# Patient Record
Sex: FEMALE | Race: BLACK OR AFRICAN AMERICAN | NOT HISPANIC OR LATINO | Employment: UNEMPLOYED | URBAN - METROPOLITAN AREA
[De-identification: names, ages, dates, MRNs, and addresses within clinical notes are randomized per-mention and may not be internally consistent; named-entity substitution may affect disease eponyms.]

---

## 2017-01-23 ENCOUNTER — PATIENT OUTREACH (OUTPATIENT)
Dept: ADMINISTRATIVE | Facility: HOSPITAL | Age: 62
End: 2017-01-23
Payer: COMMERCIAL

## 2017-01-23 NOTE — PATIENT INSTRUCTIONS
Mailed patient a letter regarding overdue health maintenance and need for an office visit with the pcp

## 2018-03-11 ENCOUNTER — HOSPITAL ENCOUNTER (EMERGENCY)
Facility: HOSPITAL | Age: 63
Discharge: HOME | End: 2018-03-11
Attending: EMERGENCY MEDICINE
Payer: COMMERCIAL

## 2018-03-11 VITALS
HEIGHT: 65 IN | OXYGEN SATURATION: 100 % | WEIGHT: 185 LBS | TEMPERATURE: 99 F | HEART RATE: 103 BPM | BODY MASS INDEX: 30.82 KG/M2 | SYSTOLIC BLOOD PRESSURE: 225 MMHG | RESPIRATION RATE: 18 BRPM | DIASTOLIC BLOOD PRESSURE: 88 MMHG

## 2018-03-11 DIAGNOSIS — Z91.89 AT RISK FOR HYPERGLYCEMIA: Primary | ICD-10-CM

## 2018-03-11 DIAGNOSIS — R53.1 GENERAL WEAKNESS: ICD-10-CM

## 2018-03-11 LAB — POCT GLUCOSE: 134 MG/DL (ref 70–110)

## 2018-03-11 PROCEDURE — 82962 GLUCOSE BLOOD TEST: CPT

## 2018-03-11 PROCEDURE — 99282 EMERGENCY DEPT VISIT SF MDM: CPT | Mod: ,,, | Performed by: EMERGENCY MEDICINE

## 2018-03-11 PROCEDURE — 99283 EMERGENCY DEPT VISIT LOW MDM: CPT | Mod: 25

## 2018-03-11 RX ORDER — INSULIN GLARGINE 100 [IU]/ML
25 INJECTION, SOLUTION SUBCUTANEOUS DAILY
COMMUNITY
End: 2020-03-17

## 2018-03-11 RX ORDER — NIFEDIPINE 10 MG/1
30 CAPSULE ORAL DAILY
COMMUNITY
End: 2020-03-17 | Stop reason: ALTCHOICE

## 2018-03-11 RX ORDER — CHLORTHALIDONE 25 MG/1
25 TABLET ORAL DAILY
COMMUNITY

## 2018-03-11 NOTE — ED PROVIDER NOTES
"Encounter Date: 3/11/2018    SCRIBE #1 NOTE: I, Cira Christine, am scribing for, and in the presence of, Dr. Shaheed Hawk.       History     Chief Complaint   Patient presents with    Blood Sugar Problem     recently taken off Novolog. pt states "lantus hasnt been working". reports blood sugars up and down for the last month     Time seen by provider: 5:28 PM    This is a 62 y.o. female with medical conditions including DM, HTN, HLD, who presents with complaint of general weakness and a feeling of being off balance associated with high blood sugar. The patient reports that her blood sugar has been widely fluctuating high and low for the past month and a half. This morning she checked her sugar around 8-9 AM and had a reading of 166. Around 10 AM she began feeling poorly, generally weak and off balance and she measured her sugar at 440. At that time she took 4 units of novalog and began to feel improved. Her PCP had taken her off of novalog and added a new medication on 2/23 in hopes to regulate her sugar but the patient continued to have issues. She re contacted her doctor on 3/5 who adjusted her lantus but this has not corrected her swings in blood sugar. The patient also notes that her sugars have been getting very low at night, into the 50s. Her blood pressure was elevated on arrival but her blood pressure log shows pretty stable numbers. She reports medication compliance. The patient denies any focal numbness or weakness, nausea or vomiting.       The history is provided by the patient.     Review of patient's allergies indicates:   Allergen Reactions    Januvia [sitagliptin]     Lipitor [atorvastatin]      Shoulder pain    Norvasc [amlodipine]      Weak, dizzy    Oxycodone-acetaminophen     Hydralazine analogues Anxiety     Past Medical History:   Diagnosis Date    Diabetes mellitus type II     DM (diabetes mellitus)     HLD (hyperlipidemia)     Hyperlipidemia     Hypertension     Obesity     " Peripheral neuropathy      Past Surgical History:   Procedure Laterality Date    BACK SURGERY      HYSTERECTOMY       Family History   Problem Relation Age of Onset    Diabetes Mother     Diabetes Sister     Diabetes Brother     Diabetes Sister     Diabetes Sister     Diabetes Sister     Diabetes Sister     Hypertension      Coronary artery disease Neg Hx     Cancer Neg Hx      Social History   Substance Use Topics    Smoking status: Never Smoker    Smokeless tobacco: Never Used    Alcohol use No     Review of Systems   Constitutional: Positive for fatigue. Negative for chills and fever.   HENT: Negative for facial swelling and nosebleeds.    Eyes: Negative for visual disturbance.   Respiratory: Negative for shortness of breath.    Cardiovascular: Negative for chest pain.   Gastrointestinal: Negative for abdominal pain, nausea and vomiting.   Genitourinary: Negative for difficulty urinating.   Musculoskeletal: Negative for gait problem.   Skin: Negative for rash.   Neurological: Positive for weakness (Generalized). Negative for syncope, speech difficulty, numbness and headaches.   Psychiatric/Behavioral: Negative for confusion.       Physical Exam     Initial Vitals [03/11/18 1612]   BP Pulse Resp Temp SpO2   (!) 225/88 103 18 98.5 °F (36.9 °C) 100 %      MAP       133.67         Physical Exam    Nursing note and vitals reviewed.  Constitutional: She appears well-developed and well-nourished. She is not diaphoretic. No distress.   HENT:   Head: Normocephalic and atraumatic.   Eyes: Conjunctivae are normal. Pupils are equal, round, and reactive to light.   Neck: Normal range of motion. Neck supple.   Cardiovascular: Normal rate, regular rhythm and normal heart sounds.   Pulmonary/Chest: Breath sounds normal. No respiratory distress.   Abdominal: Soft. She exhibits no distension.   Musculoskeletal: Normal range of motion. She exhibits no edema.   Neurological: She is alert and oriented to person, place,  and time. She has normal strength.   Skin: Skin is warm and dry.   Psychiatric: She has a normal mood and affect.         ED Course   Procedures  Labs Reviewed   POCT GLUCOSE - Abnormal; Notable for the following:        Result Value    POCT Glucose 134 (*)     All other components within normal limits   POCT GLUCOSE MONITORING CONTINUOUS             Medical Decision Making:   History:   Old Medical Records: I decided to obtain old medical records.  Initial Assessment:   62 y.o. female presents with hyperglycemia at home, she self treated with novalog and is now asymptomatic. It sounds like she is having difficulty controlling her diabetes and previous visits with her primary doctor have not been beneficial. I suggested taking 2 metformin in the morning to help control daytime spikes but I highly recommended to her that she follow up closely with her primary care doctor for diabetes management. Pressure was very high on initial exam in the ed but improved on recheck. Advised pt to follow up with PCP or return if concerning symptoms arise. Pt understands and agrees with plan. Will d/c home.                Scribe Attestation:   Scribe #1: I performed the above scribed service and the documentation accurately describes the services I performed. I attest to the accuracy of the note.               Clinical Impression:   The encounter diagnosis was At risk for hyperglycemia.    Disposition:   Disposition: Discharged  Condition: Stable                        Shaheed Hawk MD  03/19/18 0913

## 2018-03-11 NOTE — ED TRIAGE NOTES
"Patient states Blood sugar going up and down x 1 month,  Saturday, 166 Sunday. States getting "bad" Feb 15th. States this morning UZ=349 before eating lunch today, see daily log of Bg levels , States she currently feels weak" around my eyes" Denies nausea, headache or fever. Also concerned B/P elevated in triage. Was taken off Novolog sliding scale with meals Feb 23rd.  Routine PMD at [facility].  "

## 2018-03-11 NOTE — ED NOTES
Patient identifiers verified and correct for MS Rhoades  C/C: Liable Bg levels  APPEARANCE: awake and alert in NAD.  SKIN: warm, dry and intact. No breakdown or bruising.  MUSCULOSKELETAL: Patient moving all extremities spontaneously, no obvious swelling or deformities noted. Ambulates independently.  RESPIRATORY: Denies shortness of breath.Respirations unlabored.   CARDIAC: Denies CP, 2+ distal pulses; no peripheral edema  ABDOMEN: S/ND/NT, Denies nausea  : voids spontaneously, denies difficulty  Neurologic: AAO x 4; follows commands equal strength in all extremities; denies numbness/tingling. Denies dizziness Denies weakness

## 2018-03-26 DIAGNOSIS — Z12.31 SCREENING MAMMOGRAM, ENCOUNTER FOR: Primary | ICD-10-CM

## 2018-03-29 ENCOUNTER — HOSPITAL ENCOUNTER (OUTPATIENT)
Dept: RADIOLOGY | Facility: HOSPITAL | Age: 63
Discharge: HOME | End: 2018-03-29
Attending: FAMILY MEDICINE
Payer: COMMERCIAL

## 2018-03-29 DIAGNOSIS — Z12.31 SCREENING MAMMOGRAM, ENCOUNTER FOR: ICD-10-CM

## 2018-03-29 PROCEDURE — 77067 SCR MAMMO BI INCL CAD: CPT | Mod: 26,,, | Performed by: RADIOLOGY

## 2018-03-29 PROCEDURE — 77067 SCR MAMMO BI INCL CAD: CPT | Mod: TC

## 2018-04-16 ENCOUNTER — TELEPHONE (OUTPATIENT)
Dept: RADIOLOGY | Facility: HOSPITAL | Age: 63
End: 2018-04-16

## 2018-04-19 ENCOUNTER — HOSPITAL ENCOUNTER (OUTPATIENT)
Dept: RADIOLOGY | Facility: HOSPITAL | Age: 63
Discharge: HOME | End: 2018-04-19
Attending: FAMILY MEDICINE
Payer: COMMERCIAL

## 2018-04-19 DIAGNOSIS — R92.8 ABNORMAL MAMMOGRAM: ICD-10-CM

## 2018-04-19 PROCEDURE — 77061 BREAST TOMOSYNTHESIS UNI: CPT | Mod: TC,LT

## 2018-04-19 PROCEDURE — 77061 BREAST TOMOSYNTHESIS UNI: CPT | Mod: 26,LT,, | Performed by: RADIOLOGY

## 2018-04-19 PROCEDURE — 77065 DX MAMMO INCL CAD UNI: CPT | Mod: TC,LT

## 2018-04-19 PROCEDURE — 77065 DX MAMMO INCL CAD UNI: CPT | Mod: 26,LT,, | Performed by: RADIOLOGY

## 2019-03-18 DIAGNOSIS — Z12.39 BREAST SCREENING: Primary | ICD-10-CM

## 2020-03-08 ENCOUNTER — HOSPITAL ENCOUNTER (INPATIENT)
Facility: HOSPITAL | Age: 65
LOS: 2 days | Discharge: HOME | DRG: 246 | End: 2020-03-10
Attending: EMERGENCY MEDICINE | Admitting: HOSPITALIST
Payer: MEDICAID

## 2020-03-08 DIAGNOSIS — I46.9 CARDIAC ARREST: ICD-10-CM

## 2020-03-08 DIAGNOSIS — I25.10 CORONARY ARTERY DISEASE INVOLVING NATIVE CORONARY ARTERY OF NATIVE HEART WITHOUT ANGINA PECTORIS: Primary | ICD-10-CM

## 2020-03-08 PROBLEM — J96.00 ACUTE RESPIRATORY FAILURE: Status: ACTIVE | Noted: 2020-03-08

## 2020-03-08 LAB
ALBUMIN SERPL BCP-MCNC: 3 G/DL (ref 3.5–5.2)
ALLENS TEST: ABNORMAL
ALP SERPL-CCNC: 132 U/L (ref 55–135)
ALT SERPL W/O P-5'-P-CCNC: 73 U/L (ref 10–44)
ANION GAP SERPL CALC-SCNC: 11 MMOL/L (ref 8–16)
ANION GAP SERPL CALC-SCNC: 14 MMOL/L (ref 8–16)
ANION GAP SERPL CALC-SCNC: 14 MMOL/L (ref 8–16)
APTT BLDCRRT: 101.4 SEC (ref 21–32)
APTT BLDCRRT: 22.8 SEC (ref 21–32)
APTT BLDCRRT: 31.7 SEC (ref 21–32)
APTT BLDCRRT: 76.2 SEC (ref 21–32)
AST SERPL-CCNC: 102 U/L (ref 10–40)
BACTERIA #/AREA URNS HPF: NORMAL /HPF
BASOPHILS # BLD AUTO: 0.02 K/UL (ref 0–0.2)
BASOPHILS # BLD AUTO: 0.05 K/UL (ref 0–0.2)
BASOPHILS NFR BLD: 0.1 % (ref 0–1.9)
BASOPHILS NFR BLD: 0.5 % (ref 0–1.9)
BILIRUB SERPL-MCNC: 0.3 MG/DL (ref 0.1–1)
BILIRUB UR QL STRIP: NEGATIVE
BNP SERPL-MCNC: 65 PG/ML (ref 0–99)
BUN SERPL-MCNC: 15 MG/DL (ref 8–23)
BUN SERPL-MCNC: 16 MG/DL (ref 8–23)
BUN SERPL-MCNC: 16 MG/DL (ref 8–23)
CALCIUM SERPL-MCNC: 8.7 MG/DL (ref 8.7–10.5)
CALCIUM SERPL-MCNC: 8.9 MG/DL (ref 8.7–10.5)
CALCIUM SERPL-MCNC: 9.1 MG/DL (ref 8.7–10.5)
CHLORIDE SERPL-SCNC: 100 MMOL/L (ref 95–110)
CHLORIDE SERPL-SCNC: 100 MMOL/L (ref 95–110)
CHLORIDE SERPL-SCNC: 102 MMOL/L (ref 95–110)
CLARITY UR: CLEAR
CO2 SERPL-SCNC: 24 MMOL/L (ref 23–29)
CO2 SERPL-SCNC: 25 MMOL/L (ref 23–29)
CO2 SERPL-SCNC: 26 MMOL/L (ref 23–29)
COLOR UR: YELLOW
CREAT SERPL-MCNC: 1 MG/DL (ref 0.5–1.4)
CREAT SERPL-MCNC: 1.1 MG/DL (ref 0.5–1.4)
CREAT SERPL-MCNC: 1.2 MG/DL (ref 0.5–1.4)
DELSYS: ABNORMAL
DIFFERENTIAL METHOD: ABNORMAL
DIFFERENTIAL METHOD: ABNORMAL
EOSINOPHIL # BLD AUTO: 0 K/UL (ref 0–0.5)
EOSINOPHIL # BLD AUTO: 0.1 K/UL (ref 0–0.5)
EOSINOPHIL NFR BLD: 0.1 % (ref 0–8)
EOSINOPHIL NFR BLD: 1.4 % (ref 0–8)
ERYTHROCYTE [DISTWIDTH] IN BLOOD BY AUTOMATED COUNT: 13.9 % (ref 11.5–14.5)
ERYTHROCYTE [DISTWIDTH] IN BLOOD BY AUTOMATED COUNT: 13.9 % (ref 11.5–14.5)
ERYTHROCYTE [SEDIMENTATION RATE] IN BLOOD BY WESTERGREN METHOD: 24 MM/H
EST. GFR  (AFRICAN AMERICAN): 56 ML/MIN/1.73 M^2
EST. GFR  (AFRICAN AMERICAN): >60 ML/MIN/1.73 M^2
EST. GFR  (AFRICAN AMERICAN): >60 ML/MIN/1.73 M^2
EST. GFR  (NON AFRICAN AMERICAN): 48 ML/MIN/1.73 M^2
EST. GFR  (NON AFRICAN AMERICAN): 54 ML/MIN/1.73 M^2
EST. GFR  (NON AFRICAN AMERICAN): >60 ML/MIN/1.73 M^2
ESTIMATED AVG GLUCOSE: 206 MG/DL (ref 68–131)
FIO2: 100
GLUCOSE SERPL-MCNC: 198 MG/DL (ref 70–110)
GLUCOSE SERPL-MCNC: 363 MG/DL (ref 70–110)
GLUCOSE SERPL-MCNC: 372 MG/DL (ref 70–110)
GLUCOSE UR QL STRIP: NEGATIVE
HBA1C MFR BLD HPLC: 8.8 % (ref 4–5.6)
HCO3 UR-SCNC: 26.4 MMOL/L (ref 24–28)
HCT VFR BLD AUTO: 38.5 % (ref 37–48.5)
HCT VFR BLD AUTO: 42.3 % (ref 37–48.5)
HGB BLD-MCNC: 12.1 G/DL (ref 12–16)
HGB BLD-MCNC: 13.3 G/DL (ref 12–16)
HGB UR QL STRIP: ABNORMAL
HYALINE CASTS #/AREA URNS LPF: 1 /LPF
IMM GRANULOCYTES # BLD AUTO: 0.11 K/UL (ref 0–0.04)
IMM GRANULOCYTES # BLD AUTO: 0.29 K/UL (ref 0–0.04)
IMM GRANULOCYTES NFR BLD AUTO: 0.7 % (ref 0–0.5)
IMM GRANULOCYTES NFR BLD AUTO: 2.9 % (ref 0–0.5)
INR PPP: 0.9 (ref 0.8–1.2)
KETONES UR QL STRIP: NEGATIVE
LACTATE SERPL-SCNC: 4.2 MMOL/L (ref 0.5–2.2)
LEUKOCYTE ESTERASE UR QL STRIP: NEGATIVE
LYMPHOCYTES # BLD AUTO: 1.5 K/UL (ref 1–4.8)
LYMPHOCYTES # BLD AUTO: 6 K/UL (ref 1–4.8)
LYMPHOCYTES NFR BLD: 10.4 % (ref 18–48)
LYMPHOCYTES NFR BLD: 60 % (ref 18–48)
MAGNESIUM SERPL-MCNC: 1.2 MG/DL (ref 1.6–2.6)
MCH RBC QN AUTO: 24.7 PG (ref 27–31)
MCH RBC QN AUTO: 25 PG (ref 27–31)
MCHC RBC AUTO-ENTMCNC: 31.4 G/DL (ref 32–36)
MCHC RBC AUTO-ENTMCNC: 31.4 G/DL (ref 32–36)
MCV RBC AUTO: 79 FL (ref 82–98)
MCV RBC AUTO: 80 FL (ref 82–98)
MICROSCOPIC COMMENT: NORMAL
MODE: ABNORMAL
MONOCYTES # BLD AUTO: 0.7 K/UL (ref 0.3–1)
MONOCYTES # BLD AUTO: 1 K/UL (ref 0.3–1)
MONOCYTES NFR BLD: 6.7 % (ref 4–15)
MONOCYTES NFR BLD: 7 % (ref 4–15)
NEUTROPHILS # BLD AUTO: 12.1 K/UL (ref 1.8–7.7)
NEUTROPHILS # BLD AUTO: 2.9 K/UL (ref 1.8–7.7)
NEUTROPHILS NFR BLD: 28.5 % (ref 38–73)
NEUTROPHILS NFR BLD: 81.7 % (ref 38–73)
NITRITE UR QL STRIP: NEGATIVE
NRBC BLD-RTO: 0 /100 WBC
NRBC BLD-RTO: 0 /100 WBC
PCO2 BLDA: 37.7 MMHG (ref 35–45)
PEEP: 5
PH SMN: 7.45 [PH] (ref 7.35–7.45)
PH UR STRIP: 6 [PH] (ref 5–8)
PHOSPHATE SERPL-MCNC: 2.8 MG/DL (ref 2.7–4.5)
PLATELET # BLD AUTO: 241 K/UL (ref 150–350)
PLATELET # BLD AUTO: 275 K/UL (ref 150–350)
PMV BLD AUTO: 12.4 FL (ref 9.2–12.9)
PMV BLD AUTO: 12.6 FL (ref 9.2–12.9)
PO2 BLDA: 165 MMHG (ref 80–100)
POC BE: 2 MMOL/L
POC SATURATED O2: 100 % (ref 95–100)
POC TCO2: 28 MMOL/L (ref 23–27)
POCT GLUCOSE: 294 MG/DL (ref 70–110)
POCT GLUCOSE: 336 MG/DL (ref 70–110)
POCT GLUCOSE: 385 MG/DL (ref 70–110)
POCT GLUCOSE: 397 MG/DL (ref 70–110)
POTASSIUM SERPL-SCNC: 2.8 MMOL/L (ref 3.5–5.1)
POTASSIUM SERPL-SCNC: 2.9 MMOL/L (ref 3.5–5.1)
POTASSIUM SERPL-SCNC: 5 MMOL/L (ref 3.5–5.1)
PROT SERPL-MCNC: 6.8 G/DL (ref 6–8.4)
PROT UR QL STRIP: ABNORMAL
PROTHROMBIN TIME: 10.1 SEC (ref 9–12.5)
RBC # BLD AUTO: 4.84 M/UL (ref 4–5.4)
RBC # BLD AUTO: 5.38 M/UL (ref 4–5.4)
RBC #/AREA URNS HPF: 3 /HPF (ref 0–4)
SAMPLE: ABNORMAL
SITE: ABNORMAL
SODIUM SERPL-SCNC: 135 MMOL/L (ref 136–145)
SODIUM SERPL-SCNC: 139 MMOL/L (ref 136–145)
SODIUM SERPL-SCNC: 142 MMOL/L (ref 136–145)
SP GR UR STRIP: 1.01 (ref 1–1.03)
SQUAMOUS #/AREA URNS HPF: 2 /HPF
TROPONIN I SERPL DL<=0.01 NG/ML-MCNC: 0.36 NG/ML (ref 0–0.03)
TROPONIN I SERPL DL<=0.01 NG/ML-MCNC: 1.56 NG/ML (ref 0–0.03)
TROPONIN I SERPL DL<=0.01 NG/ML-MCNC: 1.97 NG/ML (ref 0–0.03)
URN SPEC COLLECT METH UR: ABNORMAL
UROBILINOGEN UR STRIP-ACNC: NEGATIVE EU/DL
VT: 420
WBC # BLD AUTO: 14.8 K/UL (ref 3.9–12.7)
WBC # BLD AUTO: 9.99 K/UL (ref 3.9–12.7)
WBC #/AREA URNS HPF: 2 /HPF (ref 0–5)

## 2020-03-08 PROCEDURE — 83036 HEMOGLOBIN GLYCOSYLATED A1C: CPT

## 2020-03-08 PROCEDURE — S0028 INJECTION, FAMOTIDINE, 20 MG: HCPCS | Performed by: INTERNAL MEDICINE

## 2020-03-08 PROCEDURE — 63600175 PHARM REV CODE 636 W HCPCS: Performed by: INTERNAL MEDICINE

## 2020-03-08 PROCEDURE — 84484 ASSAY OF TROPONIN QUANT: CPT

## 2020-03-08 PROCEDURE — 25000003 PHARM REV CODE 250: Performed by: EMERGENCY MEDICINE

## 2020-03-08 PROCEDURE — 83880 ASSAY OF NATRIURETIC PEPTIDE: CPT

## 2020-03-08 PROCEDURE — 85610 PROTHROMBIN TIME: CPT

## 2020-03-08 PROCEDURE — 84100 ASSAY OF PHOSPHORUS: CPT

## 2020-03-08 PROCEDURE — 36600 WITHDRAWAL OF ARTERIAL BLOOD: CPT

## 2020-03-08 PROCEDURE — 85025 COMPLETE CBC W/AUTO DIFF WBC: CPT | Mod: 91

## 2020-03-08 PROCEDURE — 93005 ELECTROCARDIOGRAM TRACING: CPT

## 2020-03-08 PROCEDURE — 81000 URINALYSIS NONAUTO W/SCOPE: CPT

## 2020-03-08 PROCEDURE — 82803 BLOOD GASES ANY COMBINATION: CPT

## 2020-03-08 PROCEDURE — 27200966 HC CLOSED SUCTION SYSTEM

## 2020-03-08 PROCEDURE — 83735 ASSAY OF MAGNESIUM: CPT

## 2020-03-08 PROCEDURE — 25000003 PHARM REV CODE 250: Performed by: INTERNAL MEDICINE

## 2020-03-08 PROCEDURE — 99233 SBSQ HOSP IP/OBS HIGH 50: CPT | Mod: ,,, | Performed by: STUDENT IN AN ORGANIZED HEALTH CARE EDUCATION/TRAINING PROGRAM

## 2020-03-08 PROCEDURE — 99900035 HC TECH TIME PER 15 MIN (STAT)

## 2020-03-08 PROCEDURE — 94002 VENT MGMT INPAT INIT DAY: CPT

## 2020-03-08 PROCEDURE — 63600175 PHARM REV CODE 636 W HCPCS: Performed by: NURSE PRACTITIONER

## 2020-03-08 PROCEDURE — 25000003 PHARM REV CODE 250: Performed by: STUDENT IN AN ORGANIZED HEALTH CARE EDUCATION/TRAINING PROGRAM

## 2020-03-08 PROCEDURE — 84484 ASSAY OF TROPONIN QUANT: CPT | Mod: 91

## 2020-03-08 PROCEDURE — 31500 INSERT EMERGENCY AIRWAY: CPT

## 2020-03-08 PROCEDURE — 96365 THER/PROPH/DIAG IV INF INIT: CPT

## 2020-03-08 PROCEDURE — 80048 BASIC METABOLIC PNL TOTAL CA: CPT | Mod: 91

## 2020-03-08 PROCEDURE — 99233 PR SUBSEQUENT HOSPITAL CARE,LEVL III: ICD-10-PCS | Mod: ,,, | Performed by: STUDENT IN AN ORGANIZED HEALTH CARE EDUCATION/TRAINING PROGRAM

## 2020-03-08 PROCEDURE — 27000221 HC OXYGEN, UP TO 24 HOURS

## 2020-03-08 PROCEDURE — 20000000 HC ICU ROOM

## 2020-03-08 PROCEDURE — 63600175 PHARM REV CODE 636 W HCPCS: Performed by: HOSPITALIST

## 2020-03-08 PROCEDURE — 85730 THROMBOPLASTIN TIME PARTIAL: CPT

## 2020-03-08 PROCEDURE — 25000003 PHARM REV CODE 250

## 2020-03-08 PROCEDURE — 63600175 PHARM REV CODE 636 W HCPCS: Performed by: EMERGENCY MEDICINE

## 2020-03-08 PROCEDURE — 36415 COLL VENOUS BLD VENIPUNCTURE: CPT

## 2020-03-08 PROCEDURE — C9399 UNCLASSIFIED DRUGS OR BIOLOG: HCPCS | Performed by: NURSE PRACTITIONER

## 2020-03-08 PROCEDURE — 99291 CRITICAL CARE FIRST HOUR: CPT | Mod: 25

## 2020-03-08 PROCEDURE — 94761 N-INVAS EAR/PLS OXIMETRY MLT: CPT

## 2020-03-08 PROCEDURE — 63600175 PHARM REV CODE 636 W HCPCS

## 2020-03-08 PROCEDURE — 85730 THROMBOPLASTIN TIME PARTIAL: CPT | Mod: 91

## 2020-03-08 PROCEDURE — 63600175 PHARM REV CODE 636 W HCPCS: Performed by: STUDENT IN AN ORGANIZED HEALTH CARE EDUCATION/TRAINING PROGRAM

## 2020-03-08 PROCEDURE — 94003 VENT MGMT INPAT SUBQ DAY: CPT

## 2020-03-08 PROCEDURE — 80053 COMPREHEN METABOLIC PANEL: CPT

## 2020-03-08 PROCEDURE — 83605 ASSAY OF LACTIC ACID: CPT

## 2020-03-08 PROCEDURE — 80048 BASIC METABOLIC PNL TOTAL CA: CPT

## 2020-03-08 PROCEDURE — 96375 TX/PRO/DX INJ NEW DRUG ADDON: CPT

## 2020-03-08 RX ORDER — GLUCAGON 1 MG
1 KIT INJECTION
Status: DISCONTINUED | OUTPATIENT
Start: 2020-03-08 | End: 2020-03-10 | Stop reason: HOSPADM

## 2020-03-08 RX ORDER — CLOPIDOGREL BISULFATE 75 MG/1
75 TABLET ORAL DAILY
Status: DISCONTINUED | OUTPATIENT
Start: 2020-03-09 | End: 2020-03-10 | Stop reason: HOSPADM

## 2020-03-08 RX ORDER — ETOMIDATE 2 MG/ML
INJECTION INTRAVENOUS
Status: COMPLETED
Start: 2020-03-08 | End: 2020-03-08

## 2020-03-08 RX ORDER — FENTANYL CITRATE 50 UG/ML
50 INJECTION, SOLUTION INTRAMUSCULAR; INTRAVENOUS
Status: DISCONTINUED | OUTPATIENT
Start: 2020-03-08 | End: 2020-03-08

## 2020-03-08 RX ORDER — DEXMEDETOMIDINE HYDROCHLORIDE 4 UG/ML
1.2 INJECTION, SOLUTION INTRAVENOUS CONTINUOUS
Status: DISCONTINUED | OUTPATIENT
Start: 2020-03-08 | End: 2020-03-08

## 2020-03-08 RX ORDER — POTASSIUM CHLORIDE 7.45 MG/ML
10 INJECTION INTRAVENOUS ONCE
Status: COMPLETED | OUTPATIENT
Start: 2020-03-08 | End: 2020-03-08

## 2020-03-08 RX ORDER — MAGNESIUM SULFATE 1 G/100ML
1 INJECTION INTRAVENOUS ONCE
Status: COMPLETED | OUTPATIENT
Start: 2020-03-08 | End: 2020-03-08

## 2020-03-08 RX ORDER — INSULIN ASPART 100 [IU]/ML
1-10 INJECTION, SOLUTION INTRAVENOUS; SUBCUTANEOUS EVERY 6 HOURS PRN
Status: DISCONTINUED | OUTPATIENT
Start: 2020-03-08 | End: 2020-03-10 | Stop reason: HOSPADM

## 2020-03-08 RX ORDER — SUCCINYLCHOLINE CHLORIDE 20 MG/ML
INJECTION INTRAMUSCULAR; INTRAVENOUS
Status: COMPLETED
Start: 2020-03-08 | End: 2020-03-08

## 2020-03-08 RX ORDER — FENTANYL CITRATE 50 UG/ML
50 INJECTION, SOLUTION INTRAMUSCULAR; INTRAVENOUS
Status: DISCONTINUED | OUTPATIENT
Start: 2020-03-11 | End: 2020-03-08

## 2020-03-08 RX ORDER — NAPROXEN SODIUM 220 MG/1
81 TABLET, FILM COATED ORAL DAILY
Status: DISCONTINUED | OUTPATIENT
Start: 2020-03-08 | End: 2020-03-10 | Stop reason: HOSPADM

## 2020-03-08 RX ORDER — ROCURONIUM BROMIDE 10 MG/ML
INJECTION, SOLUTION INTRAVENOUS
Status: DISCONTINUED
Start: 2020-03-08 | End: 2020-03-08 | Stop reason: WASHOUT

## 2020-03-08 RX ORDER — SODIUM CHLORIDE 0.9 % (FLUSH) 0.9 %
10 SYRINGE (ML) INJECTION
Status: DISCONTINUED | OUTPATIENT
Start: 2020-03-08 | End: 2020-03-10 | Stop reason: HOSPADM

## 2020-03-08 RX ORDER — PROPOFOL 10 MG/ML
INJECTION, EMULSION INTRAVENOUS
Status: DISCONTINUED
Start: 2020-03-08 | End: 2020-03-08 | Stop reason: WASHOUT

## 2020-03-08 RX ORDER — PROPOFOL 10 MG/ML
10 INJECTION, EMULSION INTRAVENOUS CONTINUOUS
Status: DISCONTINUED | OUTPATIENT
Start: 2020-03-08 | End: 2020-03-08

## 2020-03-08 RX ORDER — PROPOFOL 10 MG/ML
5 VIAL (ML) INTRAVENOUS
Status: DISCONTINUED | OUTPATIENT
Start: 2020-03-08 | End: 2020-03-08

## 2020-03-08 RX ORDER — FAMOTIDINE 10 MG/ML
20 INJECTION INTRAVENOUS 2 TIMES DAILY
Status: DISCONTINUED | OUTPATIENT
Start: 2020-03-08 | End: 2020-03-09

## 2020-03-08 RX ORDER — POTASSIUM CHLORIDE 7.45 MG/ML
10 INJECTION INTRAVENOUS
Status: COMPLETED | OUTPATIENT
Start: 2020-03-08 | End: 2020-03-08

## 2020-03-08 RX ORDER — POTASSIUM CHLORIDE 20 MEQ/15ML
40 SOLUTION ORAL ONCE
Status: COMPLETED | OUTPATIENT
Start: 2020-03-08 | End: 2020-03-08

## 2020-03-08 RX ORDER — CLOPIDOGREL BISULFATE 75 MG/1
600 TABLET ORAL ONCE
Status: COMPLETED | OUTPATIENT
Start: 2020-03-08 | End: 2020-03-08

## 2020-03-08 RX ORDER — HEPARIN SODIUM,PORCINE/D5W 25000/250
12 INTRAVENOUS SOLUTION INTRAVENOUS CONTINUOUS
Status: DISCONTINUED | OUTPATIENT
Start: 2020-03-08 | End: 2020-03-09

## 2020-03-08 RX ORDER — CHLORHEXIDINE GLUCONATE ORAL RINSE 1.2 MG/ML
15 SOLUTION DENTAL 2 TIMES DAILY
Status: DISCONTINUED | OUTPATIENT
Start: 2020-03-08 | End: 2020-03-09

## 2020-03-08 RX ORDER — ONDANSETRON 2 MG/ML
4 INJECTION INTRAMUSCULAR; INTRAVENOUS EVERY 8 HOURS PRN
Status: DISCONTINUED | OUTPATIENT
Start: 2020-03-08 | End: 2020-03-10 | Stop reason: HOSPADM

## 2020-03-08 RX ORDER — ASPIRIN 325 MG
325 TABLET ORAL
Status: COMPLETED | OUTPATIENT
Start: 2020-03-08 | End: 2020-03-08

## 2020-03-08 RX ORDER — PROPOFOL 10 MG/ML
10 INJECTION, EMULSION INTRAVENOUS
Status: COMPLETED | OUTPATIENT
Start: 2020-03-08 | End: 2020-03-08

## 2020-03-08 RX ORDER — INSULIN ASPART 100 [IU]/ML
0-5 INJECTION, SOLUTION INTRAVENOUS; SUBCUTANEOUS EVERY 6 HOURS PRN
Status: DISCONTINUED | OUTPATIENT
Start: 2020-03-08 | End: 2020-03-08

## 2020-03-08 RX ORDER — CLOPIDOGREL BISULFATE 75 MG/1
600 TABLET ORAL DAILY
Status: DISCONTINUED | OUTPATIENT
Start: 2020-03-08 | End: 2020-03-08

## 2020-03-08 RX ADMIN — MAGNESIUM SULFATE 1 G: 1 INJECTION INTRAVENOUS at 01:03

## 2020-03-08 RX ADMIN — FAMOTIDINE 20 MG: 10 INJECTION, SOLUTION INTRAVENOUS at 08:03

## 2020-03-08 RX ADMIN — POTASSIUM CHLORIDE 40 MEQ: 20 SOLUTION ORAL at 09:03

## 2020-03-08 RX ADMIN — CHLORHEXIDINE GLUCONATE 0.12% ORAL RINSE 15 ML: 1.2 LIQUID ORAL at 10:03

## 2020-03-08 RX ADMIN — FAMOTIDINE 20 MG: 10 INJECTION, SOLUTION INTRAVENOUS at 09:03

## 2020-03-08 RX ADMIN — INSULIN DETEMIR 20 UNITS: 100 INJECTION, SOLUTION SUBCUTANEOUS at 10:03

## 2020-03-08 RX ADMIN — CLOPIDOGREL BISULFATE 600 MG: 75 TABLET ORAL at 11:03

## 2020-03-08 RX ADMIN — DEXMEDETOMIDINE HYDROCHLORIDE 0.2 MCG/KG/HR: 4 INJECTION, SOLUTION INTRAVENOUS at 07:03

## 2020-03-08 RX ADMIN — POTASSIUM CHLORIDE 10 MEQ: 10 INJECTION, SOLUTION INTRAVENOUS at 01:03

## 2020-03-08 RX ADMIN — SUCCINYLCHOLINE CHLORIDE 20 MG: 20 INJECTION, SOLUTION INTRAMUSCULAR; INTRAVENOUS at 03:03

## 2020-03-08 RX ADMIN — MAGNESIUM SULFATE 1 G: 1 INJECTION INTRAVENOUS at 09:03

## 2020-03-08 RX ADMIN — PROPOFOL 10 MCG/KG/MIN: 10 INJECTION, EMULSION INTRAVENOUS at 06:03

## 2020-03-08 RX ADMIN — CHLORHEXIDINE GLUCONATE 0.12% ORAL RINSE 15 ML: 1.2 LIQUID ORAL at 08:03

## 2020-03-08 RX ADMIN — POTASSIUM CHLORIDE 10 MEQ: 10 INJECTION, SOLUTION INTRAVENOUS at 04:03

## 2020-03-08 RX ADMIN — POTASSIUM CHLORIDE 10 MEQ: 10 INJECTION, SOLUTION INTRAVENOUS at 11:03

## 2020-03-08 RX ADMIN — ASPIRIN 81 MG 81 MG: 81 TABLET ORAL at 11:03

## 2020-03-08 RX ADMIN — ASPIRIN 325 MG ORAL TABLET 325 MG: 325 PILL ORAL at 04:03

## 2020-03-08 RX ADMIN — POTASSIUM CHLORIDE 10 MEQ: 10 INJECTION, SOLUTION INTRAVENOUS at 09:03

## 2020-03-08 RX ADMIN — ETOMIDATE 40 MG: 2 INJECTION INTRAVENOUS at 03:03

## 2020-03-08 RX ADMIN — INSULIN ASPART 5 UNITS: 100 INJECTION, SOLUTION INTRAVENOUS; SUBCUTANEOUS at 06:03

## 2020-03-08 RX ADMIN — PROPOFOL 10 MCG/KG/MIN: 10 INJECTION, EMULSION INTRAVENOUS at 03:03

## 2020-03-08 RX ADMIN — HEPARIN SODIUM 12 UNITS/KG/HR: 10000 INJECTION, SOLUTION INTRAVENOUS at 06:03

## 2020-03-08 RX ADMIN — PROPOFOL 10 MCG/KG/MIN: 10 INJECTION, EMULSION INTRAVENOUS at 08:03

## 2020-03-08 RX ADMIN — INSULIN ASPART 5 UNITS: 100 INJECTION, SOLUTION INTRAVENOUS; SUBCUTANEOUS at 12:03

## 2020-03-08 RX ADMIN — FENTANYL CITRATE 50 MCG: 50 INJECTION INTRAMUSCULAR; INTRAVENOUS at 08:03

## 2020-03-08 NOTE — H&P
[facility]  History & Physical    Patient Name: Julia Rhoades  MRN: [id number]  Admission Date: 3/8/2020  Attending Physician: Sam Hamilton DO   Primary Care Provider: De Pimentel MD         Patient information was obtained from past medical records and ER records.     Subjective:     Principal Problem:Cardiac arrest    Chief Complaint:   Chief Complaint   Patient presents with    Cardiac Arrest     Son called 911 after finding pt unresponsive at home. When EMS arrived patient did not have pulse. V fib on monitor. 1 shock. Obtained ROSC        HPI: Julia Rhoades is a 65 yo female with hypertension, hyperlipidemia, Type 2 Diabetes Mellitus with peripheral neuropathy and peripheral artery disease who presented to ED from home with cardiac arrest. Patient was found pulseless by her son around 0300. Pt was last seen awake about 4 hours prior. When EMS arrived, found to be in VF and received shock followed by sinus rhythm. Initial GCS score of 3. Troponin 0.364. EKG is significant for new incomplete RBBB and st depression in the anterior leads. CT head negative. Cardiology consulted per ED with recommendation for hypothermia protocol, ASA and Heparin infusion NSTEMI protocol. Patient admitted to [facility].     Past Medical History:   Diagnosis Date    Diabetes mellitus type II     DM (diabetes mellitus)     HLD (hyperlipidemia)     Hyperlipidemia     Hypertension     Obesity     Peripheral neuropathy        Past Surgical History:   Procedure Laterality Date    BACK SURGERY      HYSTERECTOMY         Review of patient's allergies indicates:   Allergen Reactions    Januvia [sitagliptin]     Lipitor [atorvastatin]      Shoulder pain    Norvasc [amlodipine]      Weak, dizzy    Oxycodone-acetaminophen     Hydralazine analogues Anxiety       No current facility-administered medications on file prior to encounter.      Current Outpatient Medications  "on File Prior to Encounter   Medication Sig    aspirin 81 mg Tab Take 1 tablet by mouth Daily.    CALCIUM CARBONATE/VITAMIN D3 (CALTRATE 600 + D ORAL) Take 1 tablet by mouth Twice daily.    chlorthalidone (HYGROTEN) 25 MG Tab Take 25 mg by mouth once daily.    ezetimibe (ZETIA) 10 mg tablet Take 10 mg by mouth once daily.    insulin glargine (LANTUS) 100 unit/mL injection Inject 25 Units into the skin once daily.    insulin needles, disposable, (BD INSULIN PEN NEEDLE UF MINI) 31 x 3/16 " Ndle Inject 1 pen into the skin 4 (four) times daily.    insulin syringe-needle U-100 (BD INSULIN SYRINGE ULT-FINE II) 1 mL 31 x 5/16" Syrg Inject 1 Syringe into the skin 5 (five) times daily.    lisinopril (PRINIVIL,ZESTRIL) 40 MG tablet TAKE ONE TABLET BY MOUTH ONCE DAILY    metformin (GLUCOPHAGE) 500 MG tablet Take 1 tablet (500 mg total) by mouth 2 (two) times daily.    NIFEdipine (PROCARDIA) 10 MG Cap Take 30 mg by mouth once daily.    PEN NEEDLE 31 X 5/16 " Ndle      Family History     Problem Relation (Age of Onset)    Diabetes Mother, Sister, Brother, Sister, Sister, Sister, Sister    Hypertension         Tobacco Use    Smoking status: Never Smoker    Smokeless tobacco: Never Used   Substance and Sexual Activity    Alcohol use: No    Drug use: No    Sexual activity: Not on file     Review of Systems   Unable to perform ROS: Intubated     Objective:     Vital Signs (Most Recent):  Temp: 97.3 °F (36.3 °C) (03/08/20 0545)  Pulse: (!) 123 (03/08/20 0600)  Resp: (!) 27 (03/08/20 0600)  BP: (!) 164/72 (03/08/20 0600)  SpO2: 100 % (03/08/20 0600) Vital Signs (24h Range):  Temp:  [97.3 °F (36.3 °C)-97.4 °F (36.3 °C)] 97.3 °F (36.3 °C)  Pulse:  [106-133] 123  Resp:  [20-32] 27  SpO2:  [95 %-100 %] 100 %  BP: (117-184)/(62-85) 164/72     Weight: 84.5 kg (186 lb 4.6 oz)  Body mass index is 31 kg/m².    Physical Exam   Constitutional: She appears well-developed and well-nourished.   HENT:   Head: Normocephalic and " atraumatic.   Eyes: Pupils are equal, round, and reactive to light. Conjunctivae are normal.   Neck: Normal range of motion. No JVD present.   Cardiovascular: Regular rhythm, normal heart sounds and intact distal pulses.   Tachycardic    Pulmonary/Chest: Breath sounds normal. No respiratory distress. She has no wheezes.   Intubated    Abdominal: Soft. Bowel sounds are normal. She exhibits no distension. There is no tenderness. There is no guarding.   Musculoskeletal: She exhibits no edema or tenderness.   Neurological: She is alert.   Restless, agitated, does not follow commands, withdraws to painful stimuli    Skin: Skin is warm and dry. Capillary refill takes less than 2 seconds.         CRANIAL NERVES     CN III, IV, VI   Pupils are equal, round, and reactive to light.       Significant Labs:   BMP:   Recent Labs   Lab 03/08/20  0326   *      K 2.9*      CO2 26   BUN 16   CREATININE 1.2   CALCIUM 8.9     CBC:   Recent Labs   Lab 03/08/20  0326   WBC 9.99   HGB 13.3   HCT 42.3        Urine Studies:   Recent Labs   Lab 03/08/20  0330   COLORU Yellow   APPEARANCEUA Clear   PHUR 6.0   SPECGRAV 1.015   PROTEINUA 2+*   GLUCUA Negative   KETONESU Negative   BILIRUBINUA Negative   OCCULTUA Trace*   NITRITE Negative   UROBILINOGEN Negative   LEUKOCYTESUR Negative   RBCUA 3   WBCUA 2   BACTERIA Rare   SQUAMEPITHEL 2   HYALINECASTS 1       Significant Imaging: I have reviewed all pertinent imaging results/findings within the past 24 hours.    Assessment/Plan:     * Cardiac arrest  Initial rhythm V fib. 1 shock applied. ROSC within 2 minutes. Time patient was found down to BLS is unknown. Received 80 bicarb and 4 of narcan per EMS. Cardiology following.     Hold TTM 2/2 patient awake and alert, withdraws to pain, however does not follow commands   Reassess neuro status    Continue Heparin infusion   Check TTE         DM (diabetes mellitus)  Peripheral neuropathy    poct glucose q6h, NPO,  hypoglycemia protocol       Hypertension  Hyperlipidemia    Unable to verify home meds at this time         VTE Risk Mitigation (From admission, onward)         Ordered     heparin 25,000 units in dextrose 5% 250 mL (100 units/mL) infusion LOW INTENSITY nomogram - [facility]  Continuous     Question:  Heparin Infusion Adjustment (DO NOT MODIFY ANSWER)  Answer:  \\[facility]\epic\Images\Pharmacy\HeparinInfusions\heparin LOW INTENSITY nomogram for [facility] [id number].pdf    03/08/20 0428     heparin 25,000 units in dextrose 5% (100 units/ml) IV bolus from bag - ADDITIONAL PRN BOLUS - 60 units/kg (max bolus 4000 units)  As needed (PRN)     Question:  Heparin Infusion Adjustment (DO NOT MODIFY ANSWER)  Answer:  \\[employer]\epic\Images\Pharmacy\HeparinInfusions\heparin LOW INTENSITY nomogram for [facility] [id number].pdf    03/08/20 0428     heparin 25,000 units in dextrose 5% (100 units/ml) IV bolus from bag - ADDITIONAL PRN BOLUS - 30 units/kg (max bolus 4000 units)  As needed (PRN)     Question:  Heparin Infusion Adjustment (DO NOT MODIFY ANSWER)  Answer:  \\[employer] [facility]\epic\Images\Pharmacy\HeparinInfusions\heparin LOW INTENSITY nomogram for [facility] [id number].pdf    03/08/20 0428              Critical care time spent on the evaluation and treatment of severe organ dysfunction, review of pertinent labs and imaging studies, discussions with consulting providers and discussions with patient/family: 60 minutes.     Ludy James NP  Department of Hospital Medicine   [facility]

## 2020-03-08 NOTE — SUBJECTIVE & OBJECTIVE
"Past Medical History:   Diagnosis Date    Diabetes mellitus type II     DM (diabetes mellitus)     HLD (hyperlipidemia)     Hyperlipidemia     Hypertension     Obesity     Peripheral neuropathy        Past Surgical History:   Procedure Laterality Date    BACK SURGERY      HYSTERECTOMY         Review of patient's allergies indicates:   Allergen Reactions    Januvia [sitagliptin]     Lipitor [atorvastatin]      Shoulder pain    Norvasc [amlodipine]      Weak, dizzy    Oxycodone-acetaminophen     Hydralazine analogues Anxiety       No current facility-administered medications on file prior to encounter.      Current Outpatient Medications on File Prior to Encounter   Medication Sig    aspirin 81 mg Tab Take 1 tablet by mouth Daily.    CALCIUM CARBONATE/VITAMIN D3 (CALTRATE 600 + D ORAL) Take 1 tablet by mouth Twice daily.    chlorthalidone (HYGROTEN) 25 MG Tab Take 25 mg by mouth once daily.    ezetimibe (ZETIA) 10 mg tablet Take 10 mg by mouth once daily.    insulin glargine (LANTUS) 100 unit/mL injection Inject 25 Units into the skin once daily.    insulin needles, disposable, (BD INSULIN PEN NEEDLE UF MINI) 31 x 3/16 " Ndle Inject 1 pen into the skin 4 (four) times daily.    insulin syringe-needle U-100 (BD INSULIN SYRINGE ULT-FINE II) 1 mL 31 x 5/16" Syrg Inject 1 Syringe into the skin 5 (five) times daily.    lisinopril (PRINIVIL,ZESTRIL) 40 MG tablet TAKE ONE TABLET BY MOUTH ONCE DAILY    metformin (GLUCOPHAGE) 500 MG tablet Take 1 tablet (500 mg total) by mouth 2 (two) times daily.    NIFEdipine (PROCARDIA) 10 MG Cap Take 30 mg by mouth once daily.    PEN NEEDLE 31 X 5/16 " Ndle      Family History     Problem Relation (Age of Onset)    Diabetes Mother, Sister, Brother, Sister, Sister, Sister, Sister    Hypertension         Tobacco Use    Smoking status: Never Smoker    Smokeless tobacco: Never Used   Substance and Sexual Activity    Alcohol use: No    Drug use: No    Sexual " activity: Not on file     Review of Systems   Unable to perform ROS: Intubated     Objective:     Vital Signs (Most Recent):  Temp: 97.3 °F (36.3 °C) (03/08/20 0545)  Pulse: (!) 123 (03/08/20 0600)  Resp: (!) 27 (03/08/20 0600)  BP: (!) 164/72 (03/08/20 0600)  SpO2: 100 % (03/08/20 0600) Vital Signs (24h Range):  Temp:  [97.3 °F (36.3 °C)-97.4 °F (36.3 °C)] 97.3 °F (36.3 °C)  Pulse:  [106-133] 123  Resp:  [20-32] 27  SpO2:  [95 %-100 %] 100 %  BP: (117-184)/(62-85) 164/72     Weight: 84.5 kg (186 lb 4.6 oz)  Body mass index is 31 kg/m².    Physical Exam   Constitutional: She appears well-developed and well-nourished.   HENT:   Head: Normocephalic and atraumatic.   Eyes: Pupils are equal, round, and reactive to light. Conjunctivae are normal.   Neck: Normal range of motion. No JVD present.   Cardiovascular: Regular rhythm, normal heart sounds and intact distal pulses.   Tachycardic    Pulmonary/Chest: Breath sounds normal. No respiratory distress. She has no wheezes.   Intubated    Abdominal: Soft. Bowel sounds are normal. She exhibits no distension. There is no tenderness. There is no guarding.   Musculoskeletal: She exhibits no edema or tenderness.   Neurological: She is alert.   Restless, agitated, does not follow commands, withdraws to painful stimuli    Skin: Skin is warm and dry. Capillary refill takes less than 2 seconds.         CRANIAL NERVES     CN III, IV, VI   Pupils are equal, round, and reactive to light.       Significant Labs:   BMP:   Recent Labs   Lab 03/08/20  0326   *      K 2.9*      CO2 26   BUN 16   CREATININE 1.2   CALCIUM 8.9     CBC:   Recent Labs   Lab 03/08/20  0326   WBC 9.99   HGB 13.3   HCT 42.3        Urine Studies:   Recent Labs   Lab 03/08/20  0330   COLORU Yellow   APPEARANCEUA Clear   PHUR 6.0   SPECGRAV 1.015   PROTEINUA 2+*   GLUCUA Negative   KETONESU Negative   BILIRUBINUA Negative   OCCULTUA Trace*   NITRITE Negative   UROBILINOGEN Negative    LEUKOCYTESUR Negative   RBCUA 3   WBCUA 2   BACTERIA Rare   SQUAMEPITHEL 2   HYALINECASTS 1       Significant Imaging: I have reviewed all pertinent imaging results/findings within the past 24 hours.

## 2020-03-08 NOTE — SUBJECTIVE & OBJECTIVE
Past Medical History:   Diagnosis Date    Diabetes mellitus type II     DM (diabetes mellitus)     HLD (hyperlipidemia)     Hyperlipidemia     Hypertension     Obesity     Peripheral neuropathy        Past Surgical History:   Procedure Laterality Date    BACK SURGERY      HYSTERECTOMY         Review of patient's allergies indicates:   Allergen Reactions    Januvia [sitagliptin]     Lipitor [atorvastatin]      Shoulder pain    Norvasc [amlodipine]      Weak, dizzy    Oxycodone-acetaminophen     Hydralazine analogues Anxiety       Family History     Problem Relation (Age of Onset)    Diabetes Mother, Sister, Brother, Sister, Sister, Sister, Sister    Hypertension         Tobacco Use    Smoking status: Never Smoker    Smokeless tobacco: Never Used   Substance and Sexual Activity    Alcohol use: No    Drug use: No    Sexual activity: Not on file         Review of Systems   Unable to perform ROS: Intubated     Objective:     Vital Signs (Most Recent):  Temp: 97.6 °F (36.4 °C) (03/08/20 1533)  Pulse: 81 (03/08/20 1615)  Resp: (!) 29 (03/08/20 1615)  BP: 110/63 (03/08/20 1615)  SpO2: 96 % (03/08/20 1615) Vital Signs (24h Range):  Temp:  [97.3 °F (36.3 °C)-97.9 °F (36.6 °C)] 97.6 °F (36.4 °C)  Pulse:  [] 81  Resp:  [9-53] 29  SpO2:  [91 %-100 %] 96 %  BP: ()/(55-85) 110/63     Weight: 84.5 kg (186 lb 4.6 oz)  Body mass index is 31 kg/m².      Intake/Output Summary (Last 24 hours) at 3/8/2020 1644  Last data filed at 3/8/2020 1600  Gross per 24 hour   Intake 849.96 ml   Output 945 ml   Net -95.04 ml       Physical Exam   Constitutional: She appears well-developed and well-nourished.   HENT:   Head: Normocephalic and atraumatic.   Eyes: Pupils are equal, round, and reactive to light. Conjunctivae are normal.   Neck: Normal range of motion. No JVD present.   Cardiovascular: Regular rhythm, normal heart sounds and intact distal pulses.   Tachycardic    Pulmonary/Chest: Breath sounds normal. No  respiratory distress. She has no wheezes.   Intubated    Abdominal: Soft. Bowel sounds are normal. She exhibits no distension. There is no tenderness. There is no guarding.   Musculoskeletal: She exhibits no edema or tenderness.   Neurological: She is alert.   Restless, agitated, does not follow commands, withdraws to painful stimuli    Skin: Skin is warm and dry. Capillary refill takes less than 2 seconds.       Vents:  Vent Mode: A/C (03/08/20 0858)  Ventilator Initiated: Yes (03/08/20 0330)  Set Rate: 24 BPM (03/08/20 0858)  Vt Set: 420 mL (03/08/20 0858)  PEEP/CPAP: 5 cmH20 (03/08/20 0858)  Oxygen Concentration (%): 30 (03/08/20 1030)  Peak Airway Pressure: 25 cmH2O (03/08/20 0858)  Plateau Pressure: 0 cmH20 (03/08/20 0858)  Total Ve: 10.4 mL (03/08/20 0858)  F/VT Ratio<105 (RSBI): (!) 55.56 (03/08/20 0858)    Lines/Drains/Airways     Drain                 Urethral Catheter 03/08/20 0330 Latex 16 Fr. less than 1 day          Peripheral Intravenous Line                 Peripheral IV - Single Lumen 03/08/20 20 G Left Hand less than 1 day         Peripheral IV - Single Lumen 03/08/20 20 G Right Hand less than 1 day                Significant Labs:    CBC/Anemia Profile:  Recent Labs   Lab 03/08/20  0326 03/08/20  0840   WBC 9.99 14.80*   HGB 13.3 12.1   HCT 42.3 38.5    241   MCV 79* 80*   RDW 13.9 13.9        Chemistries:  Recent Labs   Lab 03/08/20  0326 03/08/20  0840 03/08/20  1527    139 135*   K 2.9* 2.8* 5.0    100 100   CO2 26 25 24   BUN 16 16 15   CREATININE 1.2 1.1 1.0   CALCIUM 8.9 8.7 9.1   ALBUMIN 3.0*  --   --    PROT 6.8  --   --    BILITOT 0.3  --   --    ALKPHOS 132  --   --    ALT 73*  --   --    *  --   --    MG  --  1.2*  --    PHOS  --  2.8  --        All pertinent labs within the past 24 hours have been reviewed.    Significant Imaging:   I have reviewed and interpreted all pertinent imaging results/findings within the past 24 hours.

## 2020-03-08 NOTE — Clinical Note
ostial  left coronary artery. Angiography performed post intervention . Angiography performed via hand injection with 20 mL of contrast.

## 2020-03-08 NOTE — ASSESSMENT & PLAN NOTE
-out of hospital VF arrest  -poor prognostic score therefore LHC deferred  -Neuro function rapidly improved to the point of extubation  -continue to tx ACS w/ Asa/plavix/Heparin and Beta blocker  -will defer to cards timing of LHC

## 2020-03-08 NOTE — ASSESSMENT & PLAN NOTE
-intubated for failure to protect airway  -no significant oxygenation or ventilation issues  -awake and following commands; therefore, will extubate

## 2020-03-08 NOTE — ED PROVIDER NOTES
"Encounter Date: 3/8/2020    SCRIBE #1 NOTE: I, Ritika Peck, am scribing for, and in the presence of,  Dr. Burgos . I have scribed the entire note.     I, Dr. Savannah Burgos MD, personally performed the services described in this documentation. All medical record entries made by the scribe were at my direction and in my presence.  I have reviewed the chart and agree that the record reflects my personal performance and is accurate and complete. Savannah Burgos MD.    History     Chief Complaint   Patient presents with    Cardiac Arrest     Son called 911 after finding pt unresponsive at home. When EMS arrived patient did not have pulse. V fib on monitor. 1 shock. Obtained ROSC     CHIEF COMPLAINT: Patient presents with: Unresponsive       HISTORY OF PRESENT ILLNESS: Julia Rhoades who is a 64 y.o. presents to the emergency department today with complaint of unresponsiveness. Pt's son called "911" after finding the pt in bed unresponsive with eyes open around 2 AM. He reports hearing snoring noises from the pt's room, noting the pt does not snore normally. Pt's downtime unknown. EMS reports on arrival to scene pt had no pulse and was found to be in V-fib. CPR started and pt was shocked once. Pt was given bicarb and 4 mg narcan en route. No epinephrine given. Per son, pt was last known normal at 11 PM. Son denies any complaints of SOB, recent trauma or sickness.       The history is provided by the EMS personnel and a relative. The history is limited by the condition of the patient.     Review of patient's allergies indicates:   Allergen Reactions    Januvia [sitagliptin]     Lipitor [atorvastatin]      Shoulder pain    Norvasc [amlodipine]      Weak, dizzy    Oxycodone-acetaminophen     Hydralazine analogues Anxiety     Past Medical History:   Diagnosis Date    Diabetes mellitus type II     DM (diabetes mellitus)     HLD (hyperlipidemia)     Hyperlipidemia     Hypertension     Obesity     Peripheral " neuropathy      Past Surgical History:   Procedure Laterality Date    BACK SURGERY      HYSTERECTOMY       Family History   Problem Relation Age of Onset    Diabetes Mother     Diabetes Sister     Diabetes Brother     Diabetes Sister     Diabetes Sister     Diabetes Sister     Diabetes Sister     Hypertension Unknown     Coronary artery disease Neg Hx     Cancer Neg Hx      Social History     Tobacco Use    Smoking status: Never Smoker    Smokeless tobacco: Never Used   Substance Use Topics    Alcohol use: No    Drug use: No     Review of Systems   Unable to perform ROS: Patient unresponsive       Physical Exam     Initial Vitals   BP Pulse Resp Temp SpO2   03/08/20 0314 03/08/20 0314 03/08/20 0314 03/08/20 0330 03/08/20 0314   123/85 (!) 111 (!) 25 97.4 °F (36.3 °C) 95 %      MAP       --                Physical Exam    Nursing note and vitals reviewed.  Constitutional: She appears well-developed and well-nourished. She is not diaphoretic. She appears distressed.   No signs of trauma   Non responsive    HENT:   Head: Normocephalic and atraumatic.   Eyes:   Pupils reactive equally    Cardiovascular: Exam reveals no friction rub.    No murmur heard.  Tachycardic    Pulmonary/Chest:   Breathing independently    Abdominal: Soft. She exhibits no distension. There is no tenderness.   Neurological:   GCS-3  Gag reflex  No purposeful response  No withdrawal from pain    Skin: Skin is warm and dry.         ED Course   Intubation  Date/Time: 3/8/2020 3:25 AM  Performed by: Savannah Burgos MD  Authorized by: Savannah Burgos MD   Indications: respiratory failure  Intubation method: video-assisted  Patient status: unconscious  Preoxygenation: bag valve mask  Sedatives: propofol  Tube size: 7.5 mm  Number of attempts: 1  Ventilation between attempts: BVM  Complications: No    Critical Care  Date/Time: 3/8/2020 3:37 AM  Performed by: Savannah Burgos MD  Authorized by: Savannah Burgos MD   Total  critical care time (exclusive of procedural time) : 65 minutes  Critical care time was exclusive of separately billable procedures and treating other patients.  Critical care was necessary to treat or prevent imminent or life-threatening deterioration of the following conditions: respiratory failure and cardiac failure.  Critical care was time spent personally by me on the following activities: development of treatment plan with patient or surrogate, discussions with consultants, interpretation of cardiac output measurements, evaluation of patient's response to treatment, examination of patient, obtaining history from patient or surrogate, ordering and performing treatments and interventions, ordering and review of laboratory studies, ordering and review of radiographic studies, re-evaluation of patient's condition, review of old charts, discussions with primary provider, pulse oximetry and ventilator management.        Labs Reviewed   CBC W/ AUTO DIFFERENTIAL - Abnormal; Notable for the following components:       Result Value    Mean Corpuscular Volume 79 (*)     Mean Corpuscular Hemoglobin 24.7 (*)     Mean Corpuscular Hemoglobin Conc 31.4 (*)     Immature Granulocytes 2.9 (*)     Immature Grans (Abs) 0.29 (*)     Lymph # 6.0 (*)     Gran% 28.5 (*)     Lymph% 60.0 (*)     All other components within normal limits   COMPREHENSIVE METABOLIC PANEL - Abnormal; Notable for the following components:    Potassium 2.9 (*)     Glucose 198 (*)     Albumin 3.0 (*)      (*)     ALT 73 (*)     eGFR if  56 (*)     eGFR if non  48 (*)     All other components within normal limits   TROPONIN I - Abnormal; Notable for the following components:    Troponin I 0.364 (*)     All other components within normal limits   LACTIC ACID, PLASMA - Abnormal; Notable for the following components:    Lactate (Lactic Acid) 4.2 (*)     All other components within normal limits    Narrative:     LA critical  result(s) called and verbal readback obtained from   Patrizia Ramirez RN by LUIS 03/08/2020 04:13   URINALYSIS, REFLEX TO URINE CULTURE - Abnormal; Notable for the following components:    Protein, UA 2+ (*)     Occult Blood UA Trace (*)     All other components within normal limits    Narrative:     Preferred Collection Type->Urine, Clean Catch   ISTAT PROCEDURE - Abnormal; Notable for the following components:    POC PH 7.453 (*)     POC PO2 165 (*)     POC TCO2 28 (*)     All other components within normal limits   B-TYPE NATRIURETIC PEPTIDE   URINALYSIS MICROSCOPIC    Narrative:     Preferred Collection Type->Urine, Clean Catch   APTT   PROTIME-INR   PROTIME-INR   APTT   TROPONIN I   APTT   CBC W/ AUTO DIFFERENTIAL   BASIC METABOLIC PANEL   MAGNESIUM   PHOSPHORUS     EKG Readings: (Independently Interpreted)   Sinus Tachycardia at a rate of 114 bpm; incomplete RBBB; ST depressions in V1, V3, V4       Imaging Results          CT Head Without Contrast (Final result)  Result time 03/08/20 04:10:05    Final result by Andrew Mari MD (03/08/20 04:10:05)                 Impression:      No CT evidence of acute intracranial abnormality.    Mild chronic microvascular ischemic disease.      Electronically signed by: Andrew Mari MD  Date:    03/08/2020  Time:    04:10             Narrative:    EXAMINATION:  CT HEAD WITHOUT CONTRAST    CLINICAL HISTORY:  Confusion/delirium, altered LOC, unexplained;    TECHNIQUE:  Low dose axial images were obtained through the head.  Coronal and sagittal reformations were also performed. Contrast was not administered.    COMPARISON:  None.    FINDINGS:  No evidence of acute territorial infarct, hemorrhage, mass effect, or midline shift.  No evidence of generalized cerebral edema.    Mild periventricular white matter hypoattenuation, most likely attributable to chronic microvascular ischemic disease.    Ventricles are normal in size and configuration.    No displaced calvarial  "fracture.    Mild patchy mucosal thickening in the sphenoid sinuses.  Visualized paranasal sinuses and mastoid air cells are otherwise essentially clear.  Partially visualized endotracheal and orogastric tubes.                               X-Ray Chest AP Portable (Final result)  Result time 03/08/20 03:43:35    Final result by Andrew Mari MD (03/08/20 03:43:35)                 Impression:      Endotracheal tube terminates 3 cm above the nirav.    Bilateral airspace disease suggestive of pulmonary edema, aspiration, or pneumonia.      Electronically signed by: Andrew Mari MD  Date:    03/08/2020  Time:    03:43             Narrative:    EXAMINATION:  XR CHEST AP PORTABLE    CLINICAL HISTORY:  Provided history is "cardiac arrest;  ".    TECHNIQUE:  One view of the chest.    COMPARISON:  07/06/2014.    FINDINGS:  Cardiac wires and other support devices including defibrillator pads overlie the chest.  Endotracheal tube terminates approximately 3 cm above the nirav.  Nasogastric tube overlies the stomach with the side port just below the level of the gastroesophageal junction.  Cardiac silhouette is not enlarged.  There is bilateral perihilar and upper lung zone airspace disease suggestive of pulmonary edema or aspiration, less likely multifocal pneumonia.  No sizable pleural effusion.  No distinct pneumothorax.                                 Medical Decision Making:   Clinical Tests:   Lab Tests: Ordered and Reviewed  Radiological Study: Ordered and Reviewed  Medical Tests: Ordered and Reviewed                   ED Course as of Mar 08 0545   Sun Mar 08, 2020   3248 I spoke with Dr Polanco re:the pts presentation, given her poor neurologic response his recs are supportive care, hypothermia protocol, heparin drip if no intracranial hem on CT scan, obtain labs for further eval/management.     [JA]   1586 Spoke with Dr. Polanco after labs have returned. He has been updated and recommends supportitive care. " He has reviewed labs, CT, CXR and EKG.      [AJ]   [id number] Spoke with pulmonology, Dr. Powers who will follow along for support of critical care.      [AJ]     I spoke with JOANNE James re:the pts presentation and she accepts for admission.       ED Course User Index  [AJ] Ritika Peck  [JA] Savannah Burgos MD                Clinical Impression:       ICD-10-CM ICD-9-CM   1. Cardiac arrest I46.9 427.5             ED Disposition Condition    Admit                           Savannah Burgos MD  03/08/20 4088

## 2020-03-08 NOTE — PROGRESS NOTES
E-ICU    Notified by RN whether to start TTM or not because it looks like pt is starting to respond and moving about.    I camera'd in at 5:50am    Pt was restless thrashing about while on restraints. Moving all extremities but not following commands    I recommended to wait and keep assessing neuro status for a while before deciding to  start TTM    If pt becoming too dangerous to harm herself propofol prn ordered.

## 2020-03-08 NOTE — Clinical Note
Catheter is repositioned to the ostial  left coronary artery. Angiography performed of the left coronary arteries in multiple views. Angiography performed via hand injection with 20 mL of contrast.

## 2020-03-08 NOTE — ED NOTES
Dr. Burgos at bedside along with respiratory. Patient 15 L on non re breather. Patient observed having very  slight movement to arms. Pupils responsive. GCS 3

## 2020-03-08 NOTE — Clinical Note
Catheter is inserted into the ostium   right coronary artery. Angiography performed of the right coronary arteries. Angiography performed via hand injection with 5 mL of contrast.

## 2020-03-08 NOTE — HPI
Ms. Rhoades is a 63yo AAF w/ pmhx of HTN, DM, HLD who presented to [facility] early this AM after being found by son, unresponsive. EMS was called. Patient found to be pulseless and shocked for Vfib. She was transferred to [facility]. She was admitted to the ICU, intubated. On arrival to the ICU she had some spontaneous and purposeful movements. Therefore, decision, was made not to do TTM.  and son were at bedside an note no hx of CAD. They stated she wasn't complaining of anything  Out of the ordinary yesterday.     Pertinent: social Hx: never smoker; denied etoh; drugs; lives w/

## 2020-03-08 NOTE — ASSESSMENT & PLAN NOTE
Initial rhythm V fib. 1 shock applied. ROSC within 2 minutes. Time patient was found down to BLS is unknown. Received 80 bicarb and 4 of narcan per EMS. Cardiology following.     Hold TTM 2/2 patient awake and alert, withdraws to pain, however does not follow commands   Reassess neuro status    Continue Heparin infusion   Check TTE

## 2020-03-08 NOTE — PLAN OF CARE
Pt received as charted on vent flowsheet. Ambu bag and mask at bedside. All alarms on and functional with adequate volume. Cuff pressure monitored via MLT. ETT size #7.5 .Pt with no apprent respiratory distress noted. Will continue to  Monitor.

## 2020-03-08 NOTE — ED TRIAGE NOTES
Patient brought in by EMS. EMS states son called stating patient was unresponsive with agonal breathing. Sons initial concern was hypoglycemia. Per EMS pts CBG was 141. Once on monitor patient was V fib. 1 shock applied. ROSC within 2 minutes. From the time patient was found down to BLS is unknown. EMS did give 80 bicarb and 4 of narcan.     Review of patient's allergies indicates:   Allergen Reactions    Januvia [sitagliptin]     Lipitor [atorvastatin]      Shoulder pain    Norvasc [amlodipine]      Weak, dizzy    Oxycodone-acetaminophen     Hydralazine analogues Anxiety          APPEARANCE: unresponsive  SKIN: Skin is normal for race, warm, and dry. Normal skin turgor and mucous membranes moist.  CARDIAC: Normal rate and rhythm, no murmur heard.   RESPIRATORY:lungs sounds clear   PERIPHERAL VASCULAR: peripheral pulses present. Normal cap refill. No edema. Warm to touch.  EYE: +pupils responsive  ENT: EARS: no obvious drainage. NOSE: no active bleeding. THROAT: no redness or swelling. +OG Tube in place +ET tube  : +indwelling cath placed. 16F. +draining

## 2020-03-08 NOTE — H&P (VIEW-ONLY)
[facility]  Cardiology  Consult Note    Patient Name: Julia Rhoades  MRN: [id number]  Admission Date: 3/8/2020  Hospital Length of Stay: 0 days  Code Status: Full Code   Attending Provider: Sam Hamilton DO   Consulting Provider: Raj Polanco MD  Primary Care Physician: De Pimentel MD  Principal Problem:Cardiac arrest    Patient information was obtained from patient and ER records.     Inpatient consult to Cardiology  Consult performed by: Raj Polanco MD  Consult ordered by: Savannah Burgos MD        Subjective:     Chief Complaint:  Cardiac arrest, VF shock     HPI: Notified about a 63yo F with hx of DM HTN who presented from house with cardiac arrest.   Patient was found with likely agonal respirations, pulseless by her son around 0300. Pt was last seen awake about 4 hours prior.   - When EMS arrived, found to be in VF and received shock. No Epi was given. Converted into sinus rhythm.    3/8/2020; Pt seen and examined. This am, the patient has made a good neurologic recovery. Now extubated, alert, oriented at least x 2. I initially planned for urgent coronary angiogram this am now that the patient had recovering neurologic function however she still seems lethargic. She is able to follow commands but frequently forgets the prior conversation. Explained risk and benefits with the patient and family. Believe it is best to wait some more to gain some more neurologic alertness prior to proceeding to [facility].  No chest pain, some sterum soreness at the site of her chest compressions.        Past Medical History:   Diagnosis Date    Diabetes mellitus type II     DM (diabetes mellitus)     HLD (hyperlipidemia)     Hyperlipidemia     Hypertension     Obesity     Peripheral neuropathy        Past Surgical History:   Procedure Laterality Date    BACK SURGERY      HYSTERECTOMY         Review of patient's allergies indicates:   Allergen Reactions    Januvia [sitagliptin]     Lipitor  "[atorvastatin]      Shoulder pain    Norvasc [amlodipine]      Weak, dizzy    Oxycodone-acetaminophen     Hydralazine analogues Anxiety       No current facility-administered medications on file prior to encounter.      Current Outpatient Medications on File Prior to Encounter   Medication Sig    aspirin 81 mg Tab Take 1 tablet by mouth Daily.    CALCIUM CARBONATE/VITAMIN D3 (CALTRATE 600 + D ORAL) Take 1 tablet by mouth Twice daily.    chlorthalidone (HYGROTEN) 25 MG Tab Take 25 mg by mouth once daily.    ezetimibe (ZETIA) 10 mg tablet Take 10 mg by mouth once daily.    insulin glargine (LANTUS) 100 unit/mL injection Inject 25 Units into the skin once daily.    insulin needles, disposable, (BD INSULIN PEN NEEDLE UF MINI) 31 x 3/16 " Ndle Inject 1 pen into the skin 4 (four) times daily.    insulin syringe-needle U-100 (BD INSULIN SYRINGE ULT-FINE II) 1 mL 31 x 5/16" Syrg Inject 1 Syringe into the skin 5 (five) times daily.    lisinopril (PRINIVIL,ZESTRIL) 40 MG tablet TAKE ONE TABLET BY MOUTH ONCE DAILY    metformin (GLUCOPHAGE) 500 MG tablet Take 1 tablet (500 mg total) by mouth 2 (two) times daily.    NIFEdipine (PROCARDIA) 10 MG Cap Take 30 mg by mouth once daily.    PEN NEEDLE 31 X 5/16 " Ndle      Family History     Problem Relation (Age of Onset)    Diabetes Mother, Sister, Brother, Sister, Sister, Sister, Sister    Hypertension         Tobacco Use    Smoking status: Never Smoker    Smokeless tobacco: Never Used   Substance and Sexual Activity    Alcohol use: No    Drug use: No    Sexual activity: Not on file     Review of Systems   Unable to perform ROS: acuity of condition     Objective:     Vital Signs (Most Recent):  Temp: 97.9 °F (36.6 °C) (03/08/20 0713)  Pulse: 101 (03/08/20 1030)  Resp: 16 (03/08/20 1030)  BP: (!) 160/78 (03/08/20 1030)  SpO2: 98 % (03/08/20 1030) Vital Signs (24h Range):  Temp:  [97.3 °F (36.3 °C)-97.9 °F (36.6 °C)] 97.9 °F (36.6 °C)  Pulse:  [] 101  Resp:  " [16-53] 16  SpO2:  [95 %-100 %] 98 %  BP: ()/(55-85) 160/78     Weight: 84.5 kg (186 lb 4.6 oz)  Body mass index is 31 kg/m².    SpO2: 98 %  O2 Device (Oxygen Therapy): ventilator      Intake/Output Summary (Last 24 hours) at 3/8/2020 1043  Last data filed at 3/8/2020 1000  Gross per 24 hour   Intake 463.34 ml   Output 195 ml   Net 268.34 ml       Physical Exam   Constitutional: She appears well-developed and well-nourished.   HENT:   Head: Normocephalic and atraumatic.   Eyes: Conjunctivae and EOM are normal.   Neck: Normal range of motion. Neck supple. No JVD present.   Cardiovascular: Normal rate, regular rhythm and normal heart sounds.   No murmur heard.  Pulmonary/Chest: Effort normal and breath sounds normal. No respiratory distress. She has no wheezes. She has no rales.   Abdominal: Soft. Bowel sounds are normal. She exhibits no distension.   Musculoskeletal: Normal range of motion. She exhibits no edema.   Neurological: She is alert.   Skin: Skin is warm and dry. No erythema.   Psychiatric: She has a normal mood and affect. Her behavior is normal. Judgment and thought content normal.   Nursing note and vitals reviewed.      Significant Labs:   ABG:   Recent Labs   Lab 03/08/20  0330   PH 7.453*   PCO2 37.7   HCO3 26.4   POCSATURATED 100   BE 2   , BMP:   Recent Labs   Lab 03/08/20  0326 03/08/20  0840   * 363*    139   K 2.9* 2.8*    100   CO2 26 25   BUN 16 16   CREATININE 1.2 1.1   CALCIUM 8.9 8.7   MG  --  1.2*   , CMP   Recent Labs   Lab 03/08/20  0326 03/08/20  0840    139   K 2.9* 2.8*    100   CO2 26 25   * 363*   BUN 16 16   CREATININE 1.2 1.1   CALCIUM 8.9 8.7   PROT 6.8  --    ALBUMIN 3.0*  --    BILITOT 0.3  --    ALKPHOS 132  --    *  --    ALT 73*  --    ANIONGAP 14 14   ESTGFRAFRICA 56* >60   EGFRNONAA 48* 54*   , Lipid Panel No results for input(s): CHOL, HDL, LDLCALC, TRIG, CHOLHDL in the last 48 hours., Troponin   Recent Labs   Lab  03/08/20  0326 03/08/20  0840   TROPONINI 0.364* 1.559*    and All pertinent lab results from the last 24 hours have been reviewed.    Significant Imaging: Echocardiogram: 2D echo with color flow doppler: No results found for this or any previous visit. and EKG:   New incomplete RBBB with anterior ST depression    0400 ecg: sinus with anterior ST depression    Assessment and Plan:     Active Diagnoses:    Diagnosis Date Noted POA    PRINCIPAL PROBLEM:  Cardiac arrest [I46.9] 03/08/2020 Yes    PAD (peripheral artery disease) [I73.9] 05/08/2014 Yes    Obesity [E66.9] 09/26/2013 Yes    HLD (hyperlipidemia) [E78.5]  Yes    Hypertension [I10]  Yes    DM (diabetes mellitus) [E11.9]  Yes    Peripheral neuropathy [G62.9]  Yes      Problems Resolved During this Admission:     1. Cardiac arrest 2nd to VF/NSTEMI vs respiratory failure  - improving  CAHP score without known time of being down is at minium of 153 (assume 10minutes of being found unresponsive and 5 minutes until EMS arrives) which is moderate risk      Initially Neuro assessment was poor, but patient has made a good recovery. Will plan for LHC/coronary possibly tomorrow if neuro function continues to improve. PT is stuporous and couldn't tolerate swallowing water intimally. l  Trop is leveling off around 2.0   - c/w asa/plavix/statin / heparin gtt  - echo in am  - will add low dose BB now  - hopefully plan for coronary angiogram in the am 3/9      VTE Risk Mitigation (From admission, onward)         Ordered     IP VTE HIGH RISK PATIENT  Once      03/08/20 0835     heparin 25,000 units in dextrose 5% 250 mL (100 units/mL) infusion LOW INTENSITY nomogram - [facility]  Continuous     Question:  Heparin Infusion Adjustment (DO NOT MODIFY ANSWER)  Answer:  [email or web address]\Images\Pharmacy\HeparinInfusions\heparin LOW INTENSITY nomogram for [facility] [id number]    03/08/20 0428     heparin 25,000 units in dextrose 5% (100 units/ml) IV bolus from bag - ADDITIONAL PRN BOLUS  - 60 units/kg (max bolus 4000 units)  As needed (PRN)     Question:  Heparin Infusion Adjustment (DO NOT MODIFY ANSWER)  Answer:  [email or web address]\Images\Pharmacy\HeparinInfusions\heparin LOW INTENSITY nomogram for [facility] [id number]    03/08/20 0428     heparin 25,000 units in dextrose 5% (100 units/ml) IV bolus from bag - ADDITIONAL PRN BOLUS - 30 units/kg (max bolus 4000 units)  As needed (PRN)     Question:  Heparin Infusion Adjustment (DO NOT MODIFY ANSWER)  Answer:  [email or web address]\Images\Pharmacy\HeparinInfusions\heparin LOW INTENSITY nomogram for [facility] [id number]    03/08/20 0428                Thank you for your consult. I will follow-up with patient. Please contact us if you have any additional questions.    Raj Polanco MD  Cardiology   [facility]

## 2020-03-08 NOTE — Clinical Note
Catheter is inserted into the ostial  left coronary artery. Angiography performed of the left coronary arteries in multiple views. Angiography performed via hand injection with 10 mL of contrast.

## 2020-03-08 NOTE — CONSULTS
[facility]  Pulmonology  Consult Note    Patient Name: Julia Rhoades  MRN: [id number]  Admission Date: 3/8/2020  Hospital Length of Stay: 0 days  Code Status: Full Code  Attending Physician: Sam Hamilton DO  Primary Care Provider: De Pimentel MD   Principal Problem: Cardiac arrest    Consults  Subjective:     HPI:  Ms. Rhoades is a 63yo AAF w/ pmhx of HTN, DM, HLD who presented to [facility] early this AM after being found by son, unresponsive. EMS was called. Patient found to be pulseless and shocked for Vfib. She was transferred to [facility]. She was admitted to the ICU, intubated. On arrival to the ICU she had some spontaneous and purposeful movements. Therefore, decision, was made not to do TTM.  and son were at bedside an note no hx of CAD. They stated she wasn't complaining of anything  Out of the ordinary yesterday.     Pertinent: social Hx: never smoker; denied etoh; drugs; lives w/     Past Medical History:   Diagnosis Date    Diabetes mellitus type II     DM (diabetes mellitus)     HLD (hyperlipidemia)     Hyperlipidemia     Hypertension     Obesity     Peripheral neuropathy        Past Surgical History:   Procedure Laterality Date    BACK SURGERY      HYSTERECTOMY         Review of patient's allergies indicates:   Allergen Reactions    Januvia [sitagliptin]     Lipitor [atorvastatin]      Shoulder pain    Norvasc [amlodipine]      Weak, dizzy    Oxycodone-acetaminophen     Hydralazine analogues Anxiety       Family History     Problem Relation (Age of Onset)    Diabetes Mother, Sister, Brother, Sister, Sister, Sister, Sister    Hypertension         Tobacco Use    Smoking status: Never Smoker    Smokeless tobacco: Never Used   Substance and Sexual Activity    Alcohol use: No    Drug use: No    Sexual activity: Not on file         Review of Systems   Unable to perform ROS: Intubated     Objective:     Vital Signs (Most Recent):  Temp: 97.6 °F (36.4  °C) (03/08/20 1533)  Pulse: 81 (03/08/20 1615)  Resp: (!) 29 (03/08/20 1615)  BP: 110/63 (03/08/20 1615)  SpO2: 96 % (03/08/20 1615) Vital Signs (24h Range):  Temp:  [97.3 °F (36.3 °C)-97.9 °F (36.6 °C)] 97.6 °F (36.4 °C)  Pulse:  [] 81  Resp:  [9-53] 29  SpO2:  [91 %-100 %] 96 %  BP: ()/(55-85) 110/63     Weight: 84.5 kg (186 lb 4.6 oz)  Body mass index is 31 kg/m².      Intake/Output Summary (Last 24 hours) at 3/8/2020 1644  Last data filed at 3/8/2020 1600  Gross per 24 hour   Intake 849.96 ml   Output 945 ml   Net -95.04 ml       Physical Exam   Constitutional: She appears well-developed and well-nourished.   HENT:   Head: Normocephalic and atraumatic.   Eyes: Pupils are equal, round, and reactive to light. Conjunctivae are normal.   Neck: Normal range of motion. No JVD present.   Cardiovascular: Regular rhythm, normal heart sounds and intact distal pulses.   Tachycardic    Pulmonary/Chest: Breath sounds normal. No respiratory distress. She has no wheezes.   Intubated    Abdominal: Soft. Bowel sounds are normal. She exhibits no distension. There is no tenderness. There is no guarding.   Musculoskeletal: She exhibits no edema or tenderness.   Neurological: She is alert.   Restless, agitated, does not follow commands, withdraws to painful stimuli    Skin: Skin is warm and dry. Capillary refill takes less than 2 seconds.       Vents:  Vent Mode: A/C (03/08/20 0858)  Ventilator Initiated: Yes (03/08/20 0330)  Set Rate: 24 BPM (03/08/20 0858)  Vt Set: 420 mL (03/08/20 0858)  PEEP/CPAP: 5 cmH20 (03/08/20 0858)  Oxygen Concentration (%): 30 (03/08/20 1030)  Peak Airway Pressure: 25 cmH2O (03/08/20 0858)  Plateau Pressure: 0 cmH20 (03/08/20 0858)  Total Ve: 10.4 mL (03/08/20 0858)  F/VT Ratio<105 (RSBI): (!) 55.56 (03/08/20 0858)    Lines/Drains/Airways     Drain                 Urethral Catheter 03/08/20 0330 Latex 16 Fr. less than 1 day          Peripheral Intravenous Line                 Peripheral IV -  Single Lumen 03/08/20 20 G Left Hand less than 1 day         Peripheral IV - Single Lumen 03/08/20 20 G Right Hand less than 1 day                Significant Labs:    CBC/Anemia Profile:  Recent Labs   Lab 03/08/20  0326 03/08/20  0840   WBC 9.99 14.80*   HGB 13.3 12.1   HCT 42.3 38.5    241   MCV 79* 80*   RDW 13.9 13.9        Chemistries:  Recent Labs   Lab 03/08/20  0326 03/08/20  0840 03/08/20  1527    139 135*   K 2.9* 2.8* 5.0    100 100   CO2 26 25 24   BUN 16 16 15   CREATININE 1.2 1.1 1.0   CALCIUM 8.9 8.7 9.1   ALBUMIN 3.0*  --   --    PROT 6.8  --   --    BILITOT 0.3  --   --    ALKPHOS 132  --   --    ALT 73*  --   --    *  --   --    MG  --  1.2*  --    PHOS  --  2.8  --        All pertinent labs within the past 24 hours have been reviewed.    Significant Imaging:   I have reviewed and interpreted all pertinent imaging results/findings within the past 24 hours.    Assessment/Plan:     * Cardiac arrest  -out of hospital VF arrest  -poor prognostic score therefore LHC deferred  -Neuro function rapidly improved to the point of extubation  -continue to tx ACS w/ Asa/plavix/Heparin and Beta blocker  -will defer to cards timing of LHC    Acute respiratory failure  -intubated for failure to protect airway  -no significant oxygenation or ventilation issues  -awake and following commands; therefore, will extubate          Thank you for your consult. I will follow-up with patient. Please contact us if you have any additional questions.     Ernesto Powers MD  Pulmonology  [facility]

## 2020-03-08 NOTE — CONSULTS
[facility]  Cardiology  Consult Note    Patient Name: Julia Rhoades  MRN: [id number]  Admission Date: 3/8/2020  Hospital Length of Stay: 0 days  Code Status: Full Code   Attending Provider: Sam Hamilton DO   Consulting Provider: Raj Polanco MD  Primary Care Physician: De Pimentel MD  Principal Problem:Cardiac arrest    Patient information was obtained from patient and ER records.     Inpatient consult to Cardiology  Consult performed by: Raj Polanco MD  Consult ordered by: Savannah Burgos MD        Subjective:     Chief Complaint:  Cardiac arrest, VF shock     HPI: Notified about a 63yo F with hx of DM HTN who presented from house with cardiac arrest.   Patient was found with likely agonal respirations, pulseless by her son around 0300. Pt was last seen awake about 4 hours prior.   - When EMS arrived, found to be in VF and received shock. No Epi was given. Converted into sinus rhythm.    3/8/2020; Pt seen and examined. This am, the patient has made a good neurologic recovery. Now extubated, alert, oriented at least x 2. I initially planned for urgent coronary angiogram this am now that the patient had recovering neurologic function however she still seems lethargic. She is able to follow commands but frequently forgets the prior conversation. Explained risk and benefits with the patient and family. Believe it is best to wait some more to gain some more neurologic alertness prior to proceeding to [facility].  No chest pain, some sterum soreness at the site of her chest compressions.        Past Medical History:   Diagnosis Date    Diabetes mellitus type II     DM (diabetes mellitus)     HLD (hyperlipidemia)     Hyperlipidemia     Hypertension     Obesity     Peripheral neuropathy        Past Surgical History:   Procedure Laterality Date    BACK SURGERY      HYSTERECTOMY         Review of patient's allergies indicates:   Allergen Reactions    Januvia [sitagliptin]     Lipitor  "[atorvastatin]      Shoulder pain    Norvasc [amlodipine]      Weak, dizzy    Oxycodone-acetaminophen     Hydralazine analogues Anxiety       No current facility-administered medications on file prior to encounter.      Current Outpatient Medications on File Prior to Encounter   Medication Sig    aspirin 81 mg Tab Take 1 tablet by mouth Daily.    CALCIUM CARBONATE/VITAMIN D3 (CALTRATE 600 + D ORAL) Take 1 tablet by mouth Twice daily.    chlorthalidone (HYGROTEN) 25 MG Tab Take 25 mg by mouth once daily.    ezetimibe (ZETIA) 10 mg tablet Take 10 mg by mouth once daily.    insulin glargine (LANTUS) 100 unit/mL injection Inject 25 Units into the skin once daily.    insulin needles, disposable, (BD INSULIN PEN NEEDLE UF MINI) 31 x 3/16 " Ndle Inject 1 pen into the skin 4 (four) times daily.    insulin syringe-needle U-100 (BD INSULIN SYRINGE ULT-FINE II) 1 mL 31 x 5/16" Syrg Inject 1 Syringe into the skin 5 (five) times daily.    lisinopril (PRINIVIL,ZESTRIL) 40 MG tablet TAKE ONE TABLET BY MOUTH ONCE DAILY    metformin (GLUCOPHAGE) 500 MG tablet Take 1 tablet (500 mg total) by mouth 2 (two) times daily.    NIFEdipine (PROCARDIA) 10 MG Cap Take 30 mg by mouth once daily.    PEN NEEDLE 31 X 5/16 " Ndle      Family History     Problem Relation (Age of Onset)    Diabetes Mother, Sister, Brother, Sister, Sister, Sister, Sister    Hypertension         Tobacco Use    Smoking status: Never Smoker    Smokeless tobacco: Never Used   Substance and Sexual Activity    Alcohol use: No    Drug use: No    Sexual activity: Not on file     Review of Systems   Unable to perform ROS: acuity of condition     Objective:     Vital Signs (Most Recent):  Temp: 97.9 °F (36.6 °C) (03/08/20 0713)  Pulse: 101 (03/08/20 1030)  Resp: 16 (03/08/20 1030)  BP: (!) 160/78 (03/08/20 1030)  SpO2: 98 % (03/08/20 1030) Vital Signs (24h Range):  Temp:  [97.3 °F (36.3 °C)-97.9 °F (36.6 °C)] 97.9 °F (36.6 °C)  Pulse:  [] 101  Resp:  " [16-53] 16  SpO2:  [95 %-100 %] 98 %  BP: ()/(55-85) 160/78     Weight: 84.5 kg (186 lb 4.6 oz)  Body mass index is 31 kg/m².    SpO2: 98 %  O2 Device (Oxygen Therapy): ventilator      Intake/Output Summary (Last 24 hours) at 3/8/2020 1043  Last data filed at 3/8/2020 1000  Gross per 24 hour   Intake 463.34 ml   Output 195 ml   Net 268.34 ml       Physical Exam   Constitutional: She appears well-developed and well-nourished.   HENT:   Head: Normocephalic and atraumatic.   Eyes: Conjunctivae and EOM are normal.   Neck: Normal range of motion. Neck supple. No JVD present.   Cardiovascular: Normal rate, regular rhythm and normal heart sounds.   No murmur heard.  Pulmonary/Chest: Effort normal and breath sounds normal. No respiratory distress. She has no wheezes. She has no rales.   Abdominal: Soft. Bowel sounds are normal. She exhibits no distension.   Musculoskeletal: Normal range of motion. She exhibits no edema.   Neurological: She is alert.   Skin: Skin is warm and dry. No erythema.   Psychiatric: She has a normal mood and affect. Her behavior is normal. Judgment and thought content normal.   Nursing note and vitals reviewed.      Significant Labs:   ABG:   Recent Labs   Lab 03/08/20  0330   PH 7.453*   PCO2 37.7   HCO3 26.4   POCSATURATED 100   BE 2   , BMP:   Recent Labs   Lab 03/08/20  0326 03/08/20  0840   * 363*    139   K 2.9* 2.8*    100   CO2 26 25   BUN 16 16   CREATININE 1.2 1.1   CALCIUM 8.9 8.7   MG  --  1.2*   , CMP   Recent Labs   Lab 03/08/20  0326 03/08/20  0840    139   K 2.9* 2.8*    100   CO2 26 25   * 363*   BUN 16 16   CREATININE 1.2 1.1   CALCIUM 8.9 8.7   PROT 6.8  --    ALBUMIN 3.0*  --    BILITOT 0.3  --    ALKPHOS 132  --    *  --    ALT 73*  --    ANIONGAP 14 14   ESTGFRAFRICA 56* >60   EGFRNONAA 48* 54*   , Lipid Panel No results for input(s): CHOL, HDL, LDLCALC, TRIG, CHOLHDL in the last 48 hours., Troponin   Recent Labs   Lab  03/08/20  0326 03/08/20  0840   TROPONINI 0.364* 1.559*    and All pertinent lab results from the last 24 hours have been reviewed.    Significant Imaging: Echocardiogram: 2D echo with color flow doppler: No results found for this or any previous visit. and EKG:   New incomplete RBBB with anterior ST depression    0400 ecg: sinus with anterior ST depression    Assessment and Plan:     Active Diagnoses:    Diagnosis Date Noted POA    PRINCIPAL PROBLEM:  Cardiac arrest [I46.9] 03/08/2020 Yes    PAD (peripheral artery disease) [I73.9] 05/08/2014 Yes    Obesity [E66.9] 09/26/2013 Yes    HLD (hyperlipidemia) [E78.5]  Yes    Hypertension [I10]  Yes    DM (diabetes mellitus) [E11.9]  Yes    Peripheral neuropathy [G62.9]  Yes      Problems Resolved During this Admission:     1. Cardiac arrest 2nd to VF/NSTEMI vs respiratory failure  - improving  CAHP score without known time of being down is at minium of 153 (assume 10minutes of being found unresponsive and 5 minutes until EMS arrives) which is moderate risk      Initially Neuro assessment was poor, but patient has made a good recovery. Will plan for LHC/coronary possibly tomorrow if neuro function continues to improve. PT is stuporous and couldn't tolerate swallowing water intimally. l  Trop is leveling off around 2.0   - c/w asa/plavix/statin / heparin gtt  - echo in am  - will add low dose BB now  - hopefully plan for coronary angiogram in the am 3/9      VTE Risk Mitigation (From admission, onward)         Ordered     IP VTE HIGH RISK PATIENT  Once      03/08/20 0835     heparin 25,000 units in dextrose 5% 250 mL (100 units/mL) infusion LOW INTENSITY nomogram - [facility]  Continuous     Question:  Heparin Infusion Adjustment (DO NOT MODIFY ANSWER)  Answer:  [email or web address]\Images\Pharmacy\HeparinInfusions\heparin LOW INTENSITY nomogram for [facility] [id number].pdf    03/08/20 0428     heparin 25,000 units in dextrose 5% (100 units/ml) IV bolus from bag - ADDITIONAL PRN BOLUS  - 60 units/kg (max bolus 4000 units)  As needed (PRN)     Question:  Heparin Infusion Adjustment (DO NOT MODIFY ANSWER)  Answer:  [email or web address]\[email or web address]\Images\Pharmacy\HeparinInfusions\heparin LOW INTENSITY nomogram for [facility] [id number].pdf    03/08/20 0428     heparin 25,000 units in dextrose 5% (100 units/ml) IV bolus from bag - ADDITIONAL PRN BOLUS - 30 units/kg (max bolus 4000 units)  As needed (PRN)     Question:  Heparin Infusion Adjustment (DO NOT MODIFY ANSWER)  Answer:  [email or web address]\Images\Pharmacy\HeparinInfusions\heparin LOW INTENSITY nomogram for [facility] [id number]    03/08/20 0428                Thank you for your consult. I will follow-up with patient. Please contact us if you have any additional questions.    Raj Polanco MD  Cardiology   [facility]

## 2020-03-08 NOTE — CARE UPDATE
Plan of Care note:    Notified about a 61yo F with hx of DM HTN who presented from house with cardiac arrest.  Patient was found pulseless by her son around 0300. Pt was last seen awake about 4 hours prior.   - When EMS arrived, found to be in VF and received shock. Then has sinus rhythm.    CAHP score without known time of being down is at minium of 158 (assume 10minutes of being found unresponsive and 10 minutes until EMS arrives) which is moderate risk   - most likely this patient is high risk (cardiac arrest for more than 30-45min) for cardiovascular invasive events at this time      Would favor admit to ICU and start hypothermic protocol at this time due to initial GCS score of 3.  ecg is significant for new incomplete RBBB and st depression in the anterior leads.    - if CT head is WNL, no ICH, would start aspirin 325mg x 1, then 81, heparin protocol for NSTEMI, and statin.    Thank you for the opportunity to care for this patient. Will be available for questions if needed.     Raj Polanco MD  Interventional Cardiology  [facility]  Pager: [phone]

## 2020-03-08 NOTE — EICU
Intervention Initiated From:  Bedside    Andrew Communicated with Bedside Nurse regarding:  Medication   (Bedside requesting clarification of propofol order)    Doctor Notified:  Yes  (Dr Matos notified via Jabber)

## 2020-03-08 NOTE — EICU
Rounding (Video Assessment):  Yes  (Initial)    Intervention Initiated From:  Bedside    Andrew Communicated with Bedside Nurse regarding:  Other--new admit    Doctor Notified:  Yes  Dr Matos notified of new admit arrival s/p Cardiac arrest

## 2020-03-08 NOTE — Clinical Note
Catheter is inserted into the ostial  left coronary artery. Angiography performed of the left coronary arteries in multiple views. Angiography performed via hand injection with 20 mL of contrast.

## 2020-03-08 NOTE — PLAN OF CARE
Pt AAOx4. Extubated to 2L NC this AM. Tolerated well. Pt still lethargic at times, so planning for angio tomorrow per cardiology. Replacing K and Mag today. Will monitor labs closely. No complaints of chest pain today. NSR on monitor. Continuing with heparin drip and added aspirin and plavix today. Safety maintained with side rails upx2, call light within reach, family at bedside. Pt and family updated on plan of care.

## 2020-03-08 NOTE — Clinical Note
Catheter is inserted into the left ventricle. Angiography performed of the LV. Angiography performed via power injection with 30 mL contrast at 10 mL/s.

## 2020-03-08 NOTE — HPI
Julia Rhoades is a 65 yo female with hypertension, hyperlipidemia, Type 2 Diabetes Mellitus with peripheral neuropathy and peripheral artery disease who presented to ED from home with cardiac arrest. Patient was found pulseless by her son around 0300. Pt was last seen awake about 4 hours prior. When EMS arrived, found to be in VF and received shock followed by sinus rhythm. Initial GCS score of 3. Troponin 0.364. EKG is significant for new incomplete RBBB and st depression in the anterior leads. CT head negative. Cardiology consulted per ED with recommendation for hypothermia protocol, ASA and Heparin infusion NSTEMI protocol. Patient admitted to [facility].

## 2020-03-08 NOTE — ED NOTES
Son at bedside. Stated he was the one who called EMS. Reports hearing his mom having agonal breathing.  Stated pts history was HTN and Diabetes. Patient distraught. Unable to stay at bedside. Nurse let him know she would update him once all labs and scans resulted.

## 2020-03-09 LAB
ANION GAP SERPL CALC-SCNC: 10 MMOL/L (ref 8–16)
AORTIC ROOT ANNULUS: 2.52 CM
APTT BLDCRRT: 101.5 SEC (ref 21–32)
APTT BLDCRRT: 62.7 SEC (ref 21–32)
ASCENDING AORTA: 2.31 CM
AV INDEX (PROSTH): 0.87
AV MEAN GRADIENT: 4 MMHG
AV PEAK GRADIENT: 7 MMHG
AV VALVE AREA: 2.1 CM2
AV VELOCITY RATIO: 0.58
BSA FOR ECHO PROCEDURE: 1.97 M2
BUN SERPL-MCNC: 13 MG/DL (ref 8–23)
CALCIUM SERPL-MCNC: 9.2 MG/DL (ref 8.7–10.5)
CHLORIDE SERPL-SCNC: 102 MMOL/L (ref 95–110)
CO2 SERPL-SCNC: 27 MMOL/L (ref 23–29)
CREAT SERPL-MCNC: 0.9 MG/DL (ref 0.5–1.4)
CV ECHO LV RWT: 0.52 CM
DOP CALC AO PEAK VEL: 1.33 M/S
DOP CALC AO VTI: 25.22 CM
DOP CALC LVOT AREA: 2.4 CM2
DOP CALC LVOT DIAMETER: 1.75 CM
DOP CALC LVOT PEAK VEL: 0.77 M/S
DOP CALC LVOT STROKE VOLUME: 52.89 CM3
DOP CALCLVOT PEAK VEL VTI: 22 CM
E WAVE DECELERATION TIME: 164.3 MSEC
E/A RATIO: 0.92
ECHO LV POSTERIOR WALL: 1 CM (ref 0.6–1.1)
EST. GFR  (AFRICAN AMERICAN): >60 ML/MIN/1.73 M^2
EST. GFR  (NON AFRICAN AMERICAN): >60 ML/MIN/1.73 M^2
FRACTIONAL SHORTENING: 25 % (ref 28–44)
GLUCOSE SERPL-MCNC: 243 MG/DL (ref 70–110)
INTERVENTRICULAR SEPTUM: 1 CM (ref 0.6–1.1)
IVRT: 88.49 MSEC
LA MAJOR: 4.24 CM
LA MINOR: 4.9 CM
LA WIDTH: 3.77 CM
LEFT ATRIUM SIZE: 3.36 CM
LEFT ATRIUM VOLUME INDEX: 25.5 ML/M2
LEFT ATRIUM VOLUME: 48.95 CM3
LEFT INTERNAL DIMENSION IN SYSTOLE: 2.91 CM (ref 2.1–4)
LEFT VENTRICLE DIASTOLIC VOLUME INDEX: 33.86 ML/M2
LEFT VENTRICLE DIASTOLIC VOLUME: 64.95 ML
LEFT VENTRICLE MASS INDEX: 63 G/M2
LEFT VENTRICLE SYSTOLIC VOLUME INDEX: 16.9 ML/M2
LEFT VENTRICLE SYSTOLIC VOLUME: 32.46 ML
LEFT VENTRICULAR INTERNAL DIMENSION IN DIASTOLE: 3.88 CM (ref 3.5–6)
LEFT VENTRICULAR MASS: 121.15 G
MAGNESIUM SERPL-MCNC: 1.7 MG/DL (ref 1.6–2.6)
MV PEAK A VEL: 0.74 M/S
MV PEAK E VEL: 0.68 M/S
PHOSPHATE SERPL-MCNC: 3 MG/DL (ref 2.7–4.5)
PISA TR MAX VEL: 3.82 M/S
POC ACTIVATED CLOTTING TIME K: 153 SEC (ref 74–137)
POC ACTIVATED CLOTTING TIME K: 274 SEC (ref 74–137)
POC ACTIVATED CLOTTING TIME K: 274 SEC (ref 74–137)
POCT GLUCOSE: 239 MG/DL (ref 70–110)
POCT GLUCOSE: 277 MG/DL (ref 70–110)
POCT GLUCOSE: 338 MG/DL (ref 70–110)
POCT GLUCOSE: 353 MG/DL (ref 70–110)
POCT GLUCOSE: 362 MG/DL (ref 70–110)
POCT GLUCOSE: 449 MG/DL (ref 70–110)
POTASSIUM SERPL-SCNC: 2.8 MMOL/L (ref 3.5–5.1)
POTASSIUM SERPL-SCNC: 3.4 MMOL/L (ref 3.5–5.1)
PULM VEIN S/D RATIO: 1.3
PV PEAK D VEL: 0.2 M/S
PV PEAK S VEL: 0.26 M/S
RA MAJOR: 4.27 CM
RA PRESSURE: 3 MMHG
RA WIDTH: 3.66 CM
RIGHT VENTRICULAR END-DIASTOLIC DIMENSION: 2.44 CM
SAMPLE: ABNORMAL
SODIUM SERPL-SCNC: 139 MMOL/L (ref 136–145)
STJ: 2.28 CM
TR MAX PG: 58 MMHG
TRICUSPID ANNULAR PLANE SYSTOLIC EXCURSION: 1.9 CM
TV REST PULMONARY ARTERY PRESSURE: 61 MMHG

## 2020-03-09 PROCEDURE — 99152 PR MOD CONSCIOUS SEDATION, SAME PHYS, 5+ YRS, FIRST 15 MIN: ICD-10-PCS | Mod: ,,, | Performed by: INTERNAL MEDICINE

## 2020-03-09 PROCEDURE — 92928 PR STENT: ICD-10-PCS | Mod: LC,,, | Performed by: INTERNAL MEDICINE

## 2020-03-09 PROCEDURE — C1874 STENT, COATED/COV W/DEL SYS: HCPCS | Performed by: INTERNAL MEDICINE

## 2020-03-09 PROCEDURE — 25000003 PHARM REV CODE 250: Performed by: STUDENT IN AN ORGANIZED HEALTH CARE EDUCATION/TRAINING PROGRAM

## 2020-03-09 PROCEDURE — 93571 IV DOP VEL&/PRESS C FLO 1ST: CPT | Mod: LD,52 | Performed by: INTERNAL MEDICINE

## 2020-03-09 PROCEDURE — 92978 ENDOLUMINL IVUS OCT C 1ST: CPT | Mod: LD | Performed by: INTERNAL MEDICINE

## 2020-03-09 PROCEDURE — 85347 COAGULATION TIME ACTIVATED: CPT | Performed by: INTERNAL MEDICINE

## 2020-03-09 PROCEDURE — 94761 N-INVAS EAR/PLS OXIMETRY MLT: CPT

## 2020-03-09 PROCEDURE — C1894 INTRO/SHEATH, NON-LASER: HCPCS | Performed by: INTERNAL MEDICINE

## 2020-03-09 PROCEDURE — 20000000 HC ICU ROOM

## 2020-03-09 PROCEDURE — 84100 ASSAY OF PHOSPHORUS: CPT

## 2020-03-09 PROCEDURE — 99152 MOD SED SAME PHYS/QHP 5/>YRS: CPT | Mod: ,,, | Performed by: INTERNAL MEDICINE

## 2020-03-09 PROCEDURE — 99153 MOD SED SAME PHYS/QHP EA: CPT | Performed by: INTERNAL MEDICINE

## 2020-03-09 PROCEDURE — 27201423 OPTIME MED/SURG SUP & DEVICES STERILE SUPPLY: Performed by: INTERNAL MEDICINE

## 2020-03-09 PROCEDURE — 92979 ENDOLUMINL IVUS OCT C EA: CPT | Mod: LC | Performed by: INTERNAL MEDICINE

## 2020-03-09 PROCEDURE — 93458 L HRT ARTERY/VENTRICLE ANGIO: CPT | Mod: 26,59,51, | Performed by: INTERNAL MEDICINE

## 2020-03-09 PROCEDURE — 92978 PR IVUS, CORONARY, 1ST VESSEL: ICD-10-PCS | Mod: 26,LD,, | Performed by: INTERNAL MEDICINE

## 2020-03-09 PROCEDURE — 93010 EKG 12-LEAD: ICD-10-PCS | Mod: ,,, | Performed by: INTERNAL MEDICINE

## 2020-03-09 PROCEDURE — 93571 PR HEART FLOW RESERV MEASURE,INIT VESSL: ICD-10-PCS | Mod: 26,52,LD, | Performed by: INTERNAL MEDICINE

## 2020-03-09 PROCEDURE — C1753 CATH, INTRAVAS ULTRASOUND: HCPCS | Performed by: INTERNAL MEDICINE

## 2020-03-09 PROCEDURE — 80048 BASIC METABOLIC PNL TOTAL CA: CPT

## 2020-03-09 PROCEDURE — 84132 ASSAY OF SERUM POTASSIUM: CPT

## 2020-03-09 PROCEDURE — 92928 PRQ TCAT PLMT NTRAC ST 1 LES: CPT | Mod: LD,,, | Performed by: INTERNAL MEDICINE

## 2020-03-09 PROCEDURE — 85730 THROMBOPLASTIN TIME PARTIAL: CPT

## 2020-03-09 PROCEDURE — 92979 PR INTRAVASC CORONARY SO2,ADDN VESSEL: ICD-10-PCS | Mod: 26,LC,, | Performed by: INTERNAL MEDICINE

## 2020-03-09 PROCEDURE — 93010 ELECTROCARDIOGRAM REPORT: CPT | Mod: ,,, | Performed by: INTERNAL MEDICINE

## 2020-03-09 PROCEDURE — 92979 ENDOLUMINL IVUS OCT C EA: CPT | Mod: 26,LC,, | Performed by: INTERNAL MEDICINE

## 2020-03-09 PROCEDURE — 63600175 PHARM REV CODE 636 W HCPCS: Performed by: NURSE PRACTITIONER

## 2020-03-09 PROCEDURE — 25000003 PHARM REV CODE 250: Performed by: INTERNAL MEDICINE

## 2020-03-09 PROCEDURE — 36415 COLL VENOUS BLD VENIPUNCTURE: CPT

## 2020-03-09 PROCEDURE — 25000003 PHARM REV CODE 250: Performed by: NURSE PRACTITIONER

## 2020-03-09 PROCEDURE — C1725 CATH, TRANSLUMIN NON-LASER: HCPCS | Performed by: INTERNAL MEDICINE

## 2020-03-09 PROCEDURE — C9600 PERC DRUG-EL COR STENT SING: HCPCS | Mod: LD | Performed by: INTERNAL MEDICINE

## 2020-03-09 PROCEDURE — 83735 ASSAY OF MAGNESIUM: CPT

## 2020-03-09 PROCEDURE — 27000221 HC OXYGEN, UP TO 24 HOURS

## 2020-03-09 PROCEDURE — 93005 ELECTROCARDIOGRAM TRACING: CPT

## 2020-03-09 PROCEDURE — 93571 IV DOP VEL&/PRESS C FLO 1ST: CPT | Mod: 26,52,LD, | Performed by: INTERNAL MEDICINE

## 2020-03-09 PROCEDURE — C1769 GUIDE WIRE: HCPCS | Performed by: INTERNAL MEDICINE

## 2020-03-09 PROCEDURE — 63600175 PHARM REV CODE 636 W HCPCS: Performed by: EMERGENCY MEDICINE

## 2020-03-09 PROCEDURE — 93458 L HRT ARTERY/VENTRICLE ANGIO: CPT | Mod: 59,51 | Performed by: INTERNAL MEDICINE

## 2020-03-09 PROCEDURE — 63600175 PHARM REV CODE 636 W HCPCS: Performed by: INTERNAL MEDICINE

## 2020-03-09 PROCEDURE — 25000003 PHARM REV CODE 250: Performed by: HOSPITALIST

## 2020-03-09 PROCEDURE — 63600175 PHARM REV CODE 636 W HCPCS: Performed by: STUDENT IN AN ORGANIZED HEALTH CARE EDUCATION/TRAINING PROGRAM

## 2020-03-09 PROCEDURE — C1887 CATHETER, GUIDING: HCPCS | Performed by: INTERNAL MEDICINE

## 2020-03-09 PROCEDURE — 85730 THROMBOPLASTIN TIME PARTIAL: CPT | Mod: 91

## 2020-03-09 PROCEDURE — 99152 MOD SED SAME PHYS/QHP 5/>YRS: CPT | Performed by: INTERNAL MEDICINE

## 2020-03-09 PROCEDURE — 25500020 PHARM REV CODE 255: Performed by: INTERNAL MEDICINE

## 2020-03-09 PROCEDURE — 93458 PR CATH PLACE/CORON ANGIO, IMG SUPER/INTERP,W LEFT HEART VENTRICULOGRAPHY: ICD-10-PCS | Mod: 26,59,51, | Performed by: INTERNAL MEDICINE

## 2020-03-09 PROCEDURE — 92978 ENDOLUMINL IVUS OCT C 1ST: CPT | Mod: 26,LD,, | Performed by: INTERNAL MEDICINE

## 2020-03-09 DEVICE — IMPLANTABLE DEVICE: Type: IMPLANTABLE DEVICE | Site: CHEST | Status: FUNCTIONAL

## 2020-03-09 RX ORDER — FENTANYL CITRATE 50 UG/ML
INJECTION, SOLUTION INTRAMUSCULAR; INTRAVENOUS
Status: DISCONTINUED | OUTPATIENT
Start: 2020-03-09 | End: 2020-03-09 | Stop reason: HOSPADM

## 2020-03-09 RX ORDER — POTASSIUM CHLORIDE 20 MEQ/1
40 TABLET, EXTENDED RELEASE ORAL ONCE
Status: COMPLETED | OUTPATIENT
Start: 2020-03-09 | End: 2020-03-09

## 2020-03-09 RX ORDER — MAGNESIUM SULFATE HEPTAHYDRATE 40 MG/ML
2 INJECTION, SOLUTION INTRAVENOUS ONCE
Status: COMPLETED | OUTPATIENT
Start: 2020-03-09 | End: 2020-03-09

## 2020-03-09 RX ORDER — MAGNESIUM SULFATE HEPTAHYDRATE 40 MG/ML
2 INJECTION, SOLUTION INTRAVENOUS ONCE
Status: DISCONTINUED | OUTPATIENT
Start: 2020-03-09 | End: 2020-03-09

## 2020-03-09 RX ORDER — METOPROLOL TARTRATE 1 MG/ML
2.5 INJECTION, SOLUTION INTRAVENOUS EVERY 4 HOURS PRN
Status: DISCONTINUED | OUTPATIENT
Start: 2020-03-09 | End: 2020-03-10 | Stop reason: HOSPADM

## 2020-03-09 RX ORDER — IODIXANOL 320 MG/ML
INJECTION, SOLUTION INTRAVASCULAR
Status: DISCONTINUED | OUTPATIENT
Start: 2020-03-09 | End: 2020-03-09 | Stop reason: HOSPADM

## 2020-03-09 RX ORDER — POTASSIUM CHLORIDE 20 MEQ/1
40 TABLET, EXTENDED RELEASE ORAL ONCE
Status: DISCONTINUED | OUTPATIENT
Start: 2020-03-09 | End: 2020-03-09

## 2020-03-09 RX ORDER — HEPARIN SODIUM 1000 [USP'U]/ML
INJECTION, SOLUTION INTRAVENOUS; SUBCUTANEOUS
Status: DISCONTINUED | OUTPATIENT
Start: 2020-03-09 | End: 2020-03-09 | Stop reason: HOSPADM

## 2020-03-09 RX ORDER — MIDAZOLAM HYDROCHLORIDE 1 MG/ML
INJECTION, SOLUTION INTRAMUSCULAR; INTRAVENOUS
Status: DISCONTINUED | OUTPATIENT
Start: 2020-03-09 | End: 2020-03-09 | Stop reason: HOSPADM

## 2020-03-09 RX ORDER — VERAPAMIL HYDROCHLORIDE 2.5 MG/ML
INJECTION, SOLUTION INTRAVENOUS
Status: DISCONTINUED | OUTPATIENT
Start: 2020-03-09 | End: 2020-03-09 | Stop reason: HOSPADM

## 2020-03-09 RX ORDER — DIPHENHYDRAMINE HYDROCHLORIDE 50 MG/ML
INJECTION INTRAMUSCULAR; INTRAVENOUS
Status: DISCONTINUED | OUTPATIENT
Start: 2020-03-09 | End: 2020-03-09 | Stop reason: HOSPADM

## 2020-03-09 RX ORDER — HYDRALAZINE HYDROCHLORIDE 20 MG/ML
10 INJECTION INTRAMUSCULAR; INTRAVENOUS EVERY 6 HOURS PRN
Status: DISCONTINUED | OUTPATIENT
Start: 2020-03-09 | End: 2020-03-09

## 2020-03-09 RX ORDER — HEPARIN SODIUM 200 [USP'U]/100ML
INJECTION, SOLUTION INTRAVENOUS
Status: DISCONTINUED | OUTPATIENT
Start: 2020-03-09 | End: 2020-03-10 | Stop reason: HOSPADM

## 2020-03-09 RX ORDER — POTASSIUM CHLORIDE 7.45 MG/ML
10 INJECTION INTRAVENOUS
Status: DISCONTINUED | OUTPATIENT
Start: 2020-03-09 | End: 2020-03-09

## 2020-03-09 RX ORDER — LIDOCAINE HYDROCHLORIDE 10 MG/ML
INJECTION, SOLUTION EPIDURAL; INFILTRATION; INTRACAUDAL; PERINEURAL
Status: DISCONTINUED | OUTPATIENT
Start: 2020-03-09 | End: 2020-03-09 | Stop reason: HOSPADM

## 2020-03-09 RX ORDER — INSULIN ASPART 100 [IU]/ML
5 INJECTION, SOLUTION INTRAVENOUS; SUBCUTANEOUS ONCE
Status: COMPLETED | OUTPATIENT
Start: 2020-03-09 | End: 2020-03-09

## 2020-03-09 RX ADMIN — ASPIRIN 81 MG 81 MG: 81 TABLET ORAL at 09:03

## 2020-03-09 RX ADMIN — MAGNESIUM SULFATE 2 G: 2 INJECTION INTRAVENOUS at 09:03

## 2020-03-09 RX ADMIN — INSULIN ASPART 5 UNITS: 100 INJECTION, SOLUTION INTRAVENOUS; SUBCUTANEOUS at 11:03

## 2020-03-09 RX ADMIN — INSULIN ASPART 10 UNITS: 100 INJECTION, SOLUTION INTRAVENOUS; SUBCUTANEOUS at 05:03

## 2020-03-09 RX ADMIN — INSULIN ASPART 6 UNITS: 100 INJECTION, SOLUTION INTRAVENOUS; SUBCUTANEOUS at 12:03

## 2020-03-09 RX ADMIN — POTASSIUM CHLORIDE 40 MEQ: 1500 TABLET, EXTENDED RELEASE ORAL at 12:03

## 2020-03-09 RX ADMIN — HEPARIN SODIUM 13 UNITS/KG/HR: 10000 INJECTION, SOLUTION INTRAVENOUS at 01:03

## 2020-03-09 RX ADMIN — INSULIN ASPART 5 UNITS: 100 INJECTION, SOLUTION INTRAVENOUS; SUBCUTANEOUS at 09:03

## 2020-03-09 RX ADMIN — POTASSIUM CHLORIDE 40 MEQ: 1500 TABLET, EXTENDED RELEASE ORAL at 07:03

## 2020-03-09 RX ADMIN — INSULIN DETEMIR 20 UNITS: 100 INJECTION, SOLUTION SUBCUTANEOUS at 09:03

## 2020-03-09 RX ADMIN — POTASSIUM CHLORIDE 10 MEQ: 10 INJECTION, SOLUTION INTRAVENOUS at 06:03

## 2020-03-09 RX ADMIN — CLOPIDOGREL BISULFATE 75 MG: 75 TABLET ORAL at 09:03

## 2020-03-09 RX ADMIN — INSULIN ASPART 4 UNITS: 100 INJECTION, SOLUTION INTRAVENOUS; SUBCUTANEOUS at 06:03

## 2020-03-09 RX ADMIN — INSULIN ASPART 5 UNITS: 100 INJECTION, SOLUTION INTRAVENOUS; SUBCUTANEOUS at 12:03

## 2020-03-09 RX ADMIN — POTASSIUM CHLORIDE 40 MEQ: 1500 TABLET, EXTENDED RELEASE ORAL at 06:03

## 2020-03-09 RX ADMIN — CHLORHEXIDINE GLUCONATE 0.12% ORAL RINSE 15 ML: 1.2 LIQUID ORAL at 09:03

## 2020-03-09 RX ADMIN — SODIUM CHLORIDE 126.75 ML: 0.9 INJECTION, SOLUTION INTRAVENOUS at 12:03

## 2020-03-09 RX ADMIN — POTASSIUM CHLORIDE 40 MEQ: 1500 TABLET, EXTENDED RELEASE ORAL at 09:03

## 2020-03-09 NOTE — NURSING
APTT result 101.4. Heparin gtt stopped per Heparin nomogram at this time. MD Retif in department. Results discussed. STAT aPTT redraw ordered at this time. Will follow nomogram

## 2020-03-09 NOTE — SUBJECTIVE & OBJECTIVE
Interval History: feeling better after the cath, S/P 1 OLINDA placed conrtinue ASA, plavix, transfer to floor after cath period    Review of Systems   Constitutional: Negative for activity change, chills, diaphoresis and fatigue.   Respiratory: Negative for cough and shortness of breath.    Cardiovascular: Negative for chest pain.   Neurological: Negative for dizziness and numbness.     Objective:     Vital Signs (Most Recent):  Temp: 98.4 °F (36.9 °C) (03/09/20 1230)  Pulse: 87 (03/09/20 1300)  Resp: (!) 30 (03/09/20 1300)  BP: (!) 147/69 (03/09/20 1300)  SpO2: 99 % (03/09/20 1300) Vital Signs (24h Range):  Temp:  [97.6 °F (36.4 °C)-98.4 °F (36.9 °C)] 98.4 °F (36.9 °C)  Pulse:  [] 87  Resp:  [19-54] 30  SpO2:  [96 %-100 %] 99 %  BP: (105-181)/(56-85) 147/69     Weight: 84.5 kg (186 lb 4.6 oz)  Body mass index is 31 kg/m².    Intake/Output Summary (Last 24 hours) at 3/9/2020 1338  Last data filed at 3/9/2020 1254  Gross per 24 hour   Intake 434.86 ml   Output 1360 ml   Net -925.14 ml      Physical Exam   Constitutional: She is oriented to person, place, and time. She appears well-developed and well-nourished.   HENT:   Head: Normocephalic and atraumatic.   Eyes: EOM are normal.   Neck: Normal range of motion. Neck supple.   Cardiovascular: Normal rate.   Pulmonary/Chest: Effort normal. She has no wheezes.   Abdominal: Soft.   Neurological: She is alert and oriented to person, place, and time.   Psychiatric: She has a normal mood and affect. Her behavior is normal. Judgment and thought content normal.       Significant Labs:   Recent Labs   Lab 03/08/20  0326 03/08/20  0840   WBC 9.99 14.80*   HGB 13.3 12.1   HCT 42.3 38.5    241     Recent Labs   Lab 03/08/20  0840 03/08/20  1527 03/09/20  0356 03/09/20  1031    135* 139  --    K 2.8* 5.0 2.8* 3.4*    100 102  --    CO2 25 24 27  --    BUN 16 15 13  --    CREATININE 1.1 1.0 0.9  --    * 372* 243*  --    CALCIUM 8.7 9.1 9.2  --    MG 1.2*   --  1.7  --    PHOS 2.8  --  3.0  --      Recent Labs   Lab 03/08/20  0326 03/08/20  0336   ALKPHOS 132  --    ALT 73*  --    *  --    ALBUMIN 3.0*  --    PROT 6.8  --    BILITOT 0.3  --    INR  --  0.9      Recent Labs     03/08/20  0326 03/08/20  0840 03/08/20  1227   TROPONINI 0.364* 1.559* 1.968*     Recent Labs   Lab 03/08/20  1133 03/08/20  1806 03/08/20  2155 03/09/20  0033 03/09/20  0559 03/09/20  1222   POCTGLUCOSE 385* 397* 336* 362* 239* 277*     Hemoglobin A1C   Date Value Ref Range Status   03/08/2020 8.8 (H) 4.0 - 5.6 % Final     Comment:     ADA Screening Guidelines:  5.7-6.4%  Consistent with prediabetes  >or=6.5%  Consistent with diabetes  High levels of fetal hemoglobin interfere with the HbA1C  assay. Heterozygous hemoglobin variants (HbS, HgC, etc)do  not significantly interfere with this assay.   However, presence of multiple variants may affect accuracy.     11/16/2015 8.7 (H) 4.5 - 6.2 % Final   07/21/2015 8.6 (H) 4.5 - 6.2 % Final     Scheduled Meds:   aspirin  81 mg Oral Daily    chlorhexidine  15 mL Mouth/Throat BID    clopidogreL  75 mg Oral Daily    insulin detemir U-100  20 Units Subcutaneous QHS    sodium chloride 0.9%  1.5 mL/kg Intravenous Once     Continuous Infusions:   heparin (porcine)       As Needed: Dextrose 10% Bolus, glucagon (human recombinant), heparin (porcine), insulin aspart U-100, metoprolol, ondansetron, sodium chloride 0.9%    Significant Imaging:   No new imapging\

## 2020-03-09 NOTE — PROCEDURES
: Magnus Baird MD  Pre-procedure diagnosis: NSTEMI  Post-procedure diagnosis: CAD    Post Procedure Note: PCI    The pt was brought to the cath lab and under sterile technique, right radial access was obtained without difficulty. Images were obtained in multiple views and moderate LAD stenosis noted with iFR 0.85 and severe OM1 stenosis noted. Successful IVUS guided PCI to mid LAD with OLINDA and post iFR of 0.90. Successful IVUS guided PCI of OM1 with OLINDA, both with excellent results. Elevated EDP and normal EF noted. Please see full report for details. The pt tolerated the procedure well without complications. Radial band device used with successful hemostasis.     Vitals:    03/09/20 0945 03/09/20 1000 03/09/20 1015 03/09/20 1030   BP:   (!) 162/75 (!) 147/59   BP Location:       Patient Position:       Pulse: 92 100 91 86   Resp: (!) 30 (!) 39 (!) 30 (!) 30   Temp:       TempSrc:       SpO2: 98% 96% 98% 96%   Weight:       Height:             Gen: NAD  Ext: 2+ radial pulse, no evidence of hematoma  Estimated blood loss: < 50 cc    Plan:  -Post cath care per protocol  -ASA for life, plavix at least a year  -2DE  -Cardiac rehab

## 2020-03-09 NOTE — PLAN OF CARE
Patient afebrile throughout shift. AAO x 4 but has short term memory problems. NSR 80s. BP elevated but IV metoprolol not needed yet. Output good through lópez. Stable on 2L nc. Accucheks performed as ordered, SSI given. Post-cath checks performed as ordered without complications.

## 2020-03-09 NOTE — PLAN OF CARE
Problem: Adult Inpatient Plan of Care  Goal: Plan of Care Review  Outcome: Ongoing, Progressing  Flowsheets (Taken 3/9/2020 1275)  Plan of Care Reviewed With: patient; spouse     Plans for heart cath today    Patient is resting in bed. Oriented x4, but patient unable to remember things short term. Patient has been re-educated on reason for admission etc multiple times, but forgets by the next hour when asked again. Moves all extremities and follows commands. See flowsheets for details. All lines are intact and fluids are infusing without difficulty. See MAR for gtts. Bed in lowest position. Siderails x3. Will continue to monitor

## 2020-03-09 NOTE — PROGRESS NOTES
[facility] Medicine  Progress Note    Patient Name: Julia Rhoades  MRN: [id number]  Patient Class: IP- Inpatient   Admission Date: 3/8/2020  Length of Stay: 1 days  Attending Physician: Sam Hamilton DO  Primary Care Provider: De Pimentel MD        Subjective:     Principal Problem:Cardiac arrest          HPI:  Julia Rhoades is a 65 yo female with hypertension, hyperlipidemia, Type 2 Diabetes Mellitus with peripheral neuropathy and peripheral artery disease who presented to ED from home with cardiac arrest. Patient was found pulseless by her son around 0300. Pt was last seen awake about 4 hours prior. When EMS arrived, found to be in VF and received shock followed by sinus rhythm. Initial GCS score of 3. Troponin 0.364. EKG is significant for new incomplete RBBB and st depression in the anterior leads. CT head negative. Cardiology consulted per ED with recommendation for hypothermia protocol, ASA and Heparin infusion NSTEMI protocol. Patient admitted to [facility].     Overview/Hospital Course:  3/9 pt is doing ok after her LHC, she had a stemnosis in the LAD, OM1, got a stent placed, she will need plavix x1 year,aasa vilma and repeat 2 decho and cardiac reab    Interval History: feeling better after the cath, S/P 1 OLINDA placed conrtinue ASA, plavix, transfer to floor after cath period    Review of Systems   Constitutional: Negative for activity change, chills, diaphoresis and fatigue.   Respiratory: Negative for cough and shortness of breath.    Cardiovascular: Negative for chest pain.   Neurological: Negative for dizziness and numbness.     Objective:     Vital Signs (Most Recent):  Temp: 98.4 °F (36.9 °C) (03/09/20 1230)  Pulse: 87 (03/09/20 1300)  Resp: (!) 30 (03/09/20 1300)  BP: (!) 147/69 (03/09/20 1300)  SpO2: 99 % (03/09/20 1300) Vital Signs (24h Range):  Temp:  [97.6 °F (36.4 °C)-98.4 °F (36.9 °C)] 98.4 °F (36.9 °C)  Pulse:  [] 87  Resp:  [19-54]  30  SpO2:  [96 %-100 %] 99 %  BP: (105-181)/(56-85) 147/69     Weight: 84.5 kg (186 lb 4.6 oz)  Body mass index is 31 kg/m².    Intake/Output Summary (Last 24 hours) at 3/9/2020 1338  Last data filed at 3/9/2020 1254  Gross per 24 hour   Intake 434.86 ml   Output 1360 ml   Net -925.14 ml      Physical Exam   Constitutional: She is oriented to person, place, and time. She appears well-developed and well-nourished.   HENT:   Head: Normocephalic and atraumatic.   Eyes: EOM are normal.   Neck: Normal range of motion. Neck supple.   Cardiovascular: Normal rate.   Pulmonary/Chest: Effort normal. She has no wheezes.   Abdominal: Soft.   Neurological: She is alert and oriented to person, place, and time.   Psychiatric: She has a normal mood and affect. Her behavior is normal. Judgment and thought content normal.       Significant Labs:   Recent Labs   Lab 03/08/20 0326 03/08/20  0840   WBC 9.99 14.80*   HGB 13.3 12.1   HCT 42.3 38.5    241     Recent Labs   Lab 03/08/20  0840 03/08/20  1527 03/09/20  0356 03/09/20  1031    135* 139  --    K 2.8* 5.0 2.8* 3.4*    100 102  --    CO2 25 24 27  --    BUN 16 15 13  --    CREATININE 1.1 1.0 0.9  --    * 372* 243*  --    CALCIUM 8.7 9.1 9.2  --    MG 1.2*  --  1.7  --    PHOS 2.8  --  3.0  --      Recent Labs   Lab 03/08/20  0326 03/08/20  0336   ALKPHOS 132  --    ALT 73*  --    *  --    ALBUMIN 3.0*  --    PROT 6.8  --    BILITOT 0.3  --    INR  --  0.9      Recent Labs     03/08/20  0326 03/08/20  0840 03/08/20  1227   TROPONINI 0.364* 1.559* 1.968*     Recent Labs   Lab 03/08/20  1133 03/08/20  1806 03/08/20  2155 03/09/20  0033 03/09/20  0559 03/09/20  1222   POCTGLUCOSE 385* 397* 336* 362* 239* 277*     Hemoglobin A1C   Date Value Ref Range Status   03/08/2020 8.8 (H) 4.0 - 5.6 % Final     Comment:     ADA Screening Guidelines:  5.7-6.4%  Consistent with prediabetes  >or=6.5%  Consistent with diabetes  High levels of fetal hemoglobin  interfere with the HbA1C  assay. Heterozygous hemoglobin variants (HbS, HgC, etc)do  not significantly interfere with this assay.   However, presence of multiple variants may affect accuracy.     11/16/2015 8.7 (H) 4.5 - 6.2 % Final   07/21/2015 8.6 (H) 4.5 - 6.2 % Final     Scheduled Meds:   aspirin  81 mg Oral Daily    chlorhexidine  15 mL Mouth/Throat BID    clopidogreL  75 mg Oral Daily    insulin detemir U-100  20 Units Subcutaneous QHS    sodium chloride 0.9%  1.5 mL/kg Intravenous Once     Continuous Infusions:   heparin (porcine)       As Needed: Dextrose 10% Bolus, glucagon (human recombinant), heparin (porcine), insulin aspart U-100, metoprolol, ondansetron, sodium chloride 0.9%    Significant Imaging:   No new imapging\        Assessment/Plan:      * Cardiac arrest  Initial rhythm V fib. 1 shock applied. ROSC within 2 minutes. Time patient was found down to BLS is unknown. Received 80 bicarb and 4 of narcan per EMS. Cardiology following.     Hold TTM 2/2 patient awake and alert, withdraws to pain, however does not follow commands   Reassess neuro status    Continue Heparin infusion   Check TTE   3/9 S/P LHC and 1 OLINDA placed in the LAD OM1 .  Post cath care    Acute respiratory failure  3/9 resolved  Ok to transfer to tele floor      DM (diabetes mellitus)  Peripheral neuropathy    poct glucose q6h, NPO, hypoglycemia protocol       HLD (hyperlipidemia)  3/9 continue statin      Hypertension  Hyperlipidemia    Unable to verify home meds at this time   3/9 controled with current meds        VTE Risk Mitigation (From admission, onward)         Ordered     heparin infusion 1,000 units/500 ml in 0.9% NaCl (pressure line flush)  Intra-op continuous PRN      03/09/20 1052     IP VTE HIGH RISK PATIENT  Once      03/08/20 0835                Critical care time spent on the evaluation and treatment of severe organ dysfunction, review of pertinent labs and imaging studies, discussions with consulting providers  and discussions with patient/family: 34 minutes.      Sam Hamilton DO  Department of Hospital Medicine   [facility]

## 2020-03-09 NOTE — ASSESSMENT & PLAN NOTE
Initial rhythm V fib. 1 shock applied. ROSC within 2 minutes. Time patient was found down to BLS is unknown. Received 80 bicarb and 4 of narcan per EMS. Cardiology following.     Hold TTM 2/2 patient awake and alert, withdraws to pain, however does not follow commands   Reassess neuro status    Continue Heparin infusion   Check TTE   3/9 S/P LHC and 1 OLINDA placed in the LAD OM1 .  Post cath care

## 2020-03-09 NOTE — HOSPITAL COURSE
3/9 pt is doing ok after her LHC, she had a stemnosis in the LAD, OM1, got a stent placed, she will need plavix x1 year,aasa vilma and repeat 2 decho and cardiac reab

## 2020-03-09 NOTE — NURSING
Patient complaining of potassium gtt burning in both IV's. Gtt stopped at this time. Call placed to ZEENAT James. Waiting for return call

## 2020-03-09 NOTE — PROGRESS NOTES
[facility] Pulmonary/Critical Care Resident Progress Note    Primary Team: [facility] Hospitalist [facility]  Attending Physician: Sam Hamilton DO    Subjective:      Extubated last night and tolerated it well. Afebrile, no events overnight. She feels comfortable overall, denied any headache, dizziness, n/v, chest pain, SOB, abdominal pain, dysuria, constipation, diarrhea. Denied any fluttering of her chest, syncopal episodes or feeling of an aura. NPO for potential procedure this morning. Feels like her mentation is improving.     Objective:     Last 24 Hour Vital Signs:  BP  Min: 97/55  Max: 175/76  Temp  Av.9 °F (36.6 °C)  Min: 97.5 °F (36.4 °C)  Max: 98.4 °F (36.9 °C)  Pulse  Av.4  Min: 74  Max: 129  Resp  Av.6  Min: 9  Max: 54  SpO2  Av.2 %  Min: 91 %  Max: 100 %  I/O last 3 completed shifts:  In: 1132.7 [P.O.:100; I.V.:570.5; IV Piggyback:462.2]  Out: 1705 [Urine:1705]    Physical Examination:  Gen: AOx3, NAD, comfortable appearing with  at bedside  HEENT: NCAT, PERRL, EOMI, dry cracked lips, JVP ~5cm, no thyromegaly, lymphadenopathy appreciated, neck supple  CV: RRR, S1/S2 appreciated, no murmur gallop or rubs  Resp: CTA B/L, no increased WOB, no crackles, rhonchi appreciated  Abdomen: Soft, NT, ND, +BS  Ext: 2+ distal pulses, no peripheral edema appreciated  Skin: Warm, dry, no rashes appreciated  Psych: Good mood, Affect  Neuro: AAOx4, moving extremities spontaneously, sensation to touch equal bilaterally, following commands    Laboratory:  Trended Lab Data:  Recent Labs   Lab 20  0326 20  0840   WBC 9.99 14.80*   HGB 13.3 12.1   HCT 42.3 38.5    241       Recent Labs   Lab 20  0840 20  1527 20  0356    135* 139   K 2.8* 5.0 2.8*    100 102   CO2 25 24 27   BUN 16 15 13   CREATININE 1.1 1.0 0.9   * 372* 243*   CALCIUM 8.7 9.1 9.2   MG 1.2*  --  1.7   PHOS 2.8  --  3.0       Recent Labs   Lab 20  0326   PROT 6.8   ALBUMIN  3.0*   BILITOT 0.3   *   ALT 73*   ALKPHOS 132       Recent Labs   Lab 03/08/20  0336   INR 0.9       Cardiac:   Recent Labs   Lab 03/08/20  0326 03/08/20  0840 03/08/20  1227   TROPONINI 0.364* 1.559* 1.968*   BNP 65  --   --        FLP:   Lab Results   Component Value Date    CHOL 230 (H) 11/16/2015    HDL 46 11/16/2015    LDLCALC 166.4 (H) 11/16/2015    TRIG 88 11/16/2015    CHOLHDL 20.0 11/16/2015     DM:   Lab Results   Component Value Date    HGBA1C 8.8 (H) 03/08/2020    HGBA1C 8.7 (H) 11/16/2015    HGBA1C 8.6 (H) 07/21/2015    GLUF 239 (H) 04/02/2011    LDLCALC 166.4 (H) 11/16/2015    CREATININE 0.9 03/09/2020     Thyroid:   Lab Results   Component Value Date    TSH 0.577 11/16/2015    FREET4 1.12 03/09/2009     Anemia: No results found for: IRON, TIBC, FERRITIN, [id number], FOLATE  Urinalysis:   Lab Results   Component Value Date    COLORU Yellow 03/08/2020    SPECGRAV 1.015 03/08/2020    NITRITE Negative 03/08/2020    KETONESU Negative 03/08/2020    UROBILINOGEN Negative 03/08/2020     Microbiology:  Microbiology Results (last 7 days)     ** No results found for the last 168 hours. **        EKG:  Rate 100  Sinus Rhythm  Normal [place]  Prolonged QT  ST/T wave abnormalities noted in inferior leads    Radiology:  CT Head Without Contrast   Impression       No CT evidence of acute intracranial abnormality.    Mild chronic microvascular ischemic disease.           Current Medications:     Infusions:   heparin (porcine) in D5W 13 Units/kg/hr (03/09/20 0130)        Scheduled:   aspirin  81 mg Oral Daily    chlorhexidine  15 mL Mouth/Throat BID    clopidogreL  75 mg Oral Daily    famotidine (PF)  20 mg Intravenous BID    insulin detemir U-100  20 Units Subcutaneous QHS    potassium chloride  40 mEq Oral Once        PRN:  Dextrose 10% Bolus, glucagon (human recombinant), heparin (PORCINE), heparin (PORCINE), insulin aspart U-100, ondansetron, sodium chloride 0.9%    Antibiotics:  None    Assessment:      Julia Rhoades is a 64 y.o.female with HTN, HLD, DM2 on insulin who presented to [facility] s/p Cardiac arrest by VF arrest; now extubated and tolerating it well.     Plan:     Cardiac Arrest  - out of hospital: VF arrest, shocked and intubated; now extubated  - Neuro function rapidly improved  - Cardiology consulted by primary team   - ASA, Plavix   - Heparin gtt   - low dose BB   - Pending ECHO  - patient reportedly had significant myalgias when taking lipitor in the past; reportedly takes zetia at home  - pending LHC with Cardiology  - can be stepped down to the floor today     Hypokalemia, Hypomagnesemia  - K 2.8, Mg 1.7  - continue to replete    Acute Respiratory Failure  - intubated initially for airway protection, mentation improved and extubated, monitored in ICU overnight  - no issues this morning s/p extubation    DM2 on insulin with peripheral neuropathy  - A1c 8.8  - levemir 20units nightly + SSI  - holding metformin while inpatient    HTN  - -150/50-70s  - takes chlorthalidone and lisinopril at home    Thank you for allowing us to participate in the care of this patient, we will sign off at this time. Please contact us if there are any questions or concerns.    Abraham Leiva MD  [facility]  [facility] Care [facility]  [phone]

## 2020-03-09 NOTE — PLAN OF CARE
Cardiac Arrest       Son called 911 after finding pt unresponsive at home. When EMS arrived patient did not have pulse. V fib on monitor. 1 shock. Obtained ROSC         HPI: Julia Rhoades is a 65 yo female with hypertension, hyperlipidemia, Type 2 Diabetes Mellitus with peripheral neuropathy and peripheral artery disease who presented to ED from home with cardiac arrest. Patient was found pulseless by her son around 0300. Pt was last seen awake about 4 hours prior. When EMS arrived, found to be in VF and received shock followed by sinus rhythm. Initial GCS score of 3. Troponin 0.364. EKG is significant for new incomplete RBBB and st depression in the anterior leads. CT head negative. Cardiology consulted per ED with recommendation for hypothermia protocol, ASA and Heparin infusion NSTEMI protocol. Patient admitted to [facility].      The pt's currently in ICU. The Sw met with the pt and her ,son and another family member who was in the room during the assessment. The pt lives in [place] with her  and son. The pt's independent with her adl's and doesn't use any dme. The pt's  or son will transport her home at d/c. The Sw wrote her contact info and name on the white board in the pt's room. The Sw gave the pt a d/c planning brochure and encouraged her to call should she have any questions or concerns. The Sw will continue to follow the pt throughout her transition of care and will assist with any d/c needs.        03/09/20 6938   Discharge Assessment   Assessment Type Discharge Planning Assessment   Confirmed/corrected address and phone number on facesheet? Yes   Assessment information obtained from? Patient;Caregiver   Prior to hospitilization cognitive status: Alert/Oriented   Prior to hospitalization functional status: Independent   Current cognitive status: Alert/Oriented   Current Functional Status: Independent   Lives With spouse;child(sofi), adult   Able to Return to Prior  Arrangements yes   Is patient able to care for self after discharge? Yes   Who are your caregiver(s) and their phone number(s)? Gilbert Rhoades()[phone] / Bala Rhoades(son)[phone]   Patient's perception of discharge disposition home or selfcare   Readmission Within the Last 30 Days no previous admission in last 30 days   Patient currently being followed by outpatient case management? No   Patient currently receives any other outside agency services? No   Equipment Currently Used at Home none   Do you have any problems affording any of your prescribed medications? No   Is the patient taking medications as prescribed? yes   Does the patient have transportation home? Yes   Transportation Anticipated family or friend will provide   Does the patient receive services at the [facility]madin Clinic? No   Discharge Plan A Home with family   Discharge Plan B Home Health   DME Needed Upon Discharge  none   Patient/Family in Agreement with Plan yes

## 2020-03-09 NOTE — INTERVAL H&P NOTE
The patient has been examined and the H&P has been reviewed:    I concur with the findings and no changes have occurred since H&P was written.    Anesthesia/Surgery risks, benefits and alternative options discussed and understood by patient/family.          Active Hospital Problems    Diagnosis  POA    *Cardiac arrest [I46.9]  Yes    Acute respiratory failure [J96.00]  Unknown    PAD (peripheral artery disease) [I73.9]  Yes     -CTA (5/1/14): celiac artery 80-90% stenosis      Obesity [E66.9]  Yes    HLD (hyperlipidemia) [E78.5]  Yes    Hypertension [I10]  Yes    DM (diabetes mellitus) [E11.9]  Yes    Peripheral neuropathy [G62.9]  Yes      Resolved Hospital Problems   No resolved problems to display.

## 2020-03-09 NOTE — NURSING
MD Weeks called at this time. Reported K 2.8 and patient NPO for LHC scheduled today. IV KCL burning in both of patient's PIVs. Okay to cancel IV potassium and give 80 mEq PO Potassium one time.

## 2020-03-10 VITALS
BODY MASS INDEX: 30.85 KG/M2 | TEMPERATURE: 98 F | HEIGHT: 65 IN | WEIGHT: 185.19 LBS | RESPIRATION RATE: 34 BRPM | SYSTOLIC BLOOD PRESSURE: 155 MMHG | HEART RATE: 83 BPM | DIASTOLIC BLOOD PRESSURE: 76 MMHG | OXYGEN SATURATION: 100 %

## 2020-03-10 PROBLEM — I25.10 CORONARY ARTERY DISEASE INVOLVING NATIVE CORONARY ARTERY OF NATIVE HEART WITHOUT ANGINA PECTORIS: Status: ACTIVE | Noted: 2020-03-10

## 2020-03-10 LAB
ANION GAP SERPL CALC-SCNC: 10 MMOL/L (ref 8–16)
BUN SERPL-MCNC: 11 MG/DL (ref 8–23)
CALCIUM SERPL-MCNC: 8.8 MG/DL (ref 8.7–10.5)
CHLORIDE SERPL-SCNC: 105 MMOL/L (ref 95–110)
CO2 SERPL-SCNC: 25 MMOL/L (ref 23–29)
CREAT SERPL-MCNC: 0.9 MG/DL (ref 0.5–1.4)
EST. GFR  (AFRICAN AMERICAN): >60 ML/MIN/1.73 M^2
EST. GFR  (NON AFRICAN AMERICAN): >60 ML/MIN/1.73 M^2
GLUCOSE SERPL-MCNC: 175 MG/DL (ref 70–110)
MAGNESIUM SERPL-MCNC: 1.8 MG/DL (ref 1.6–2.6)
PHOSPHATE SERPL-MCNC: 1.7 MG/DL (ref 2.7–4.5)
POCT GLUCOSE: 170 MG/DL (ref 70–110)
POCT GLUCOSE: 380 MG/DL (ref 70–110)
POTASSIUM SERPL-SCNC: 3.2 MMOL/L (ref 3.5–5.1)
SODIUM SERPL-SCNC: 140 MMOL/L (ref 136–145)

## 2020-03-10 PROCEDURE — 84100 ASSAY OF PHOSPHORUS: CPT

## 2020-03-10 PROCEDURE — 99233 PR SUBSEQUENT HOSPITAL CARE,LEVL III: ICD-10-PCS | Mod: ,,, | Performed by: NURSE PRACTITIONER

## 2020-03-10 PROCEDURE — 25000003 PHARM REV CODE 250: Performed by: STUDENT IN AN ORGANIZED HEALTH CARE EDUCATION/TRAINING PROGRAM

## 2020-03-10 PROCEDURE — 99233 SBSQ HOSP IP/OBS HIGH 50: CPT | Mod: ,,, | Performed by: NURSE PRACTITIONER

## 2020-03-10 PROCEDURE — 25000003 PHARM REV CODE 250: Performed by: NURSE PRACTITIONER

## 2020-03-10 PROCEDURE — 80048 BASIC METABOLIC PNL TOTAL CA: CPT

## 2020-03-10 PROCEDURE — 36415 COLL VENOUS BLD VENIPUNCTURE: CPT

## 2020-03-10 PROCEDURE — 94761 N-INVAS EAR/PLS OXIMETRY MLT: CPT

## 2020-03-10 PROCEDURE — 83735 ASSAY OF MAGNESIUM: CPT

## 2020-03-10 RX ORDER — ROSUVASTATIN CALCIUM 10 MG/1
10 TABLET, COATED ORAL NIGHTLY
Qty: 90 TABLET | Refills: 3 | Status: SHIPPED | OUTPATIENT
Start: 2020-03-10 | End: 2021-05-07

## 2020-03-10 RX ORDER — NAPROXEN SODIUM 220 MG/1
81 TABLET, FILM COATED ORAL DAILY
Qty: 30 TABLET | Refills: 11 | Status: SHIPPED | OUTPATIENT
Start: 2020-03-11 | End: 2021-03-11

## 2020-03-10 RX ORDER — CARVEDILOL 3.12 MG/1
3.12 TABLET ORAL 2 TIMES DAILY WITH MEALS
Status: DISCONTINUED | OUTPATIENT
Start: 2020-03-10 | End: 2020-03-10

## 2020-03-10 RX ORDER — CLOPIDOGREL BISULFATE 75 MG/1
75 TABLET ORAL DAILY
Qty: 30 TABLET | Refills: 11 | Status: SHIPPED | OUTPATIENT
Start: 2020-03-11 | End: 2021-05-07 | Stop reason: ALTCHOICE

## 2020-03-10 RX ORDER — POTASSIUM CHLORIDE 20 MEQ/1
40 TABLET, EXTENDED RELEASE ORAL ONCE
Status: COMPLETED | OUTPATIENT
Start: 2020-03-10 | End: 2020-03-10

## 2020-03-10 RX ORDER — METOPROLOL SUCCINATE 25 MG/1
25 TABLET, EXTENDED RELEASE ORAL DAILY
Qty: 30 TABLET | Refills: 11 | Status: SHIPPED | OUTPATIENT
Start: 2020-03-11 | End: 2020-03-17

## 2020-03-10 RX ORDER — METOPROLOL SUCCINATE 25 MG/1
25 TABLET, EXTENDED RELEASE ORAL DAILY
Status: DISCONTINUED | OUTPATIENT
Start: 2020-03-10 | End: 2020-03-10 | Stop reason: HOSPADM

## 2020-03-10 RX ORDER — ROSUVASTATIN CALCIUM 10 MG/1
10 TABLET, COATED ORAL NIGHTLY
Status: DISCONTINUED | OUTPATIENT
Start: 2020-03-10 | End: 2020-03-10 | Stop reason: HOSPADM

## 2020-03-10 RX ADMIN — ASPIRIN 81 MG 81 MG: 81 TABLET ORAL at 08:03

## 2020-03-10 RX ADMIN — INSULIN ASPART 10 UNITS: 100 INJECTION, SOLUTION INTRAVENOUS; SUBCUTANEOUS at 12:03

## 2020-03-10 RX ADMIN — POTASSIUM CHLORIDE 40 MEQ: 1500 TABLET, EXTENDED RELEASE ORAL at 08:03

## 2020-03-10 RX ADMIN — METOPROLOL SUCCINATE 25 MG: 25 TABLET, EXTENDED RELEASE ORAL at 09:03

## 2020-03-10 RX ADMIN — INSULIN ASPART 2 UNITS: 100 INJECTION, SOLUTION INTRAVENOUS; SUBCUTANEOUS at 06:03

## 2020-03-10 RX ADMIN — CLOPIDOGREL BISULFATE 75 MG: 75 TABLET ORAL at 08:03

## 2020-03-10 NOTE — SUBJECTIVE & OBJECTIVE
Review of Systems   Constitution: Negative.   HENT: Negative.    Eyes: Negative.    Cardiovascular: Negative for chest pain, irregular heartbeat, leg swelling, near-syncope, orthopnea, palpitations, paroxysmal nocturnal dyspnea and syncope.   Respiratory: Negative for cough and shortness of breath.    Endocrine: Negative.    Hematologic/Lymphatic: Negative.    Skin: Negative.    Musculoskeletal: Positive for myalgias.   Gastrointestinal: Negative.    Genitourinary: Negative.    Neurological: Negative.    Psychiatric/Behavioral: Negative.    Allergic/Immunologic: Negative.      Objective:     Vital Signs (Most Recent):  Temp: 98.6 °F (37 °C) (03/10/20 0745)  Pulse: 88 (03/10/20 0815)  Resp: 20 (03/10/20 0815)  BP: (!) 155/72 (03/10/20 0800)  SpO2: 98 % (03/10/20 0815) Vital Signs (24h Range):  Temp:  [97.7 °F (36.5 °C)-99.1 °F (37.3 °C)] 98.6 °F (37 °C)  Pulse:  [] 88  Resp:  [15-43] 20  SpO2:  [91 %-100 %] 98 %  BP: (109-181)/() 155/72     Weight: 84 kg (185 lb 3 oz)  Body mass index is 30.82 kg/m².     SpO2: 98 %  O2 Device (Oxygen Therapy): room air      Intake/Output Summary (Last 24 hours) at 3/10/2020 0844  Last data filed at 3/10/2020 0600  Gross per 24 hour   Intake 1038.15 ml   Output 1040 ml   Net -1.85 ml       Lines/Drains/Airways     Peripheral Intravenous Line                 Peripheral IV - Single Lumen 03/08/20 20 G Left Hand 2 days                Physical Exam   Constitutional: She is oriented to person, place, and time. She appears well-developed and well-nourished. No distress.   HENT:   Head: Normocephalic and atraumatic.   Eyes: Right eye exhibits no discharge. Left eye exhibits no discharge.   Neck: No JVD present.   Cardiovascular: Normal rate, regular rhythm and normal heart sounds. Exam reveals no gallop and no friction rub.   No murmur heard.  Pulmonary/Chest: Effort normal and breath sounds normal.   Abdominal: Soft. Bowel sounds are normal.   Musculoskeletal: She exhibits  tenderness. She exhibits no edema.   Neurological: She is alert and oriented to person, place, and time.   Skin: Skin is warm and dry. She is not diaphoretic.   Psychiatric: She has a normal mood and affect. Her behavior is normal. Judgment and thought content normal.       Significant Labs:   BMP:   Recent Labs   Lab 03/08/20  1527 03/09/20  0356 03/09/20  1031 03/10/20  0424   * 243*  --  175*   * 139  --  140   K 5.0 2.8* 3.4* 3.2*    102  --  105   CO2 24 27  --  25   BUN 15 13  --  11   CREATININE 1.0 0.9  --  0.9   CALCIUM 9.1 9.2  --  8.8   MG  --  1.7  --  1.8   , CMP   Recent Labs   Lab 03/08/20  1527 03/09/20  0356 03/09/20  1031 03/10/20  0424   * 139  --  140   K 5.0 2.8* 3.4* 3.2*    102  --  105   CO2 24 27  --  25   * 243*  --  175*   BUN 15 13  --  11   CREATININE 1.0 0.9  --  0.9   CALCIUM 9.1 9.2  --  8.8   ANIONGAP 11 10  --  10   ESTGFRAFRICA >60 >60  --  >60   EGFRNONAA >60 >60  --  >60   , CBC No results for input(s): WBC, HGB, HCT, PLT in the last 48 hours., INR No results for input(s): INR, PROTIME in the last 48 hours., Lipid Panel No results for input(s): CHOL, HDL, LDLCALC, TRIG, CHOLHDL in the last 48 hours., Troponin   Recent Labs   Lab 03/08/20  1227   TROPONINI 1.968*    and All pertinent lab results from the last 24 hours have been reviewed.    Significant Imaging: Echocardiogram:   Transthoracic echo (TTE) complete (Cupid Only):   Results for orders placed or performed during the hospital encounter of 03/08/20   Echo Color Flow Doppler? Yes   Result Value Ref Range    BSA 1.97 m2    LA WIDTH 3.77 cm    LVIDD 3.88 3.5 - 6.0 cm    IVS 1.00 0.6 - 1.1 cm    PW 1.00 0.6 - 1.1 cm    Ao root annulus 2.52 cm    LVIDS 2.91 2.1 - 4.0 cm    FS 25 28 - 44 %    LA volume 48.95 cm3    STJ 2.28 cm    Ascending aorta 2.31 cm    LV mass 121.15 g    LA size 3.36 cm    RVDD 2.44 cm    TAPSE 1.90 cm    Left Ventricle Relative Wall Thickness 0.52 cm    AV mean  gradient 4 mmHg    AV valve area 2.10 cm2    AV Velocity Ratio 0.58     AV index (prosthetic) 0.87     E/A ratio 0.92     E wave decelartion time 164.30 msec    IVRT 88.49 msec    Pulm vein S/D ratio 1.30     LVOT diameter 1.75 cm    LVOT area 2.4 cm2    LVOT peak francesco 0.77 m/s    LVOT peak VTI 22.00 cm    Ao peak francesco 1.33 m/s    Ao VTI 25.22 cm    LVOT stroke volume 52.89 cm3    AV peak gradient 7 mmHg    MV Peak E Francesco 0.68 m/s    TR Max Francesco 3.82 m/s    MV Peak A Francesco 0.74 m/s    PV Peak S Francesco 0.26 m/s    PV Peak D Francesco 0.20 m/s    LV Systolic Volume 32.46 mL    LV Systolic Volume Index 16.9 mL/m2    LV Diastolic Volume 64.95 mL    LV Diastolic Volume Index 33.86 mL/m2    LA Volume Index 25.5 mL/m2    LV Mass Index 63 g/m2    RA Major Axis 4.27 cm    Left Atrium Minor Axis 4.90 cm    Left Atrium Major Axis 4.24 cm    Triscuspid Valve Regurgitation Peak Gradient 58 mmHg    RA Width 3.66 cm    Right Atrial Pressure (from IVC) 3 mmHg    TV rest pulmonary artery pressure 61 mmHg    Narrative    · Normal left ventricular systolic function. The estimated ejection   fraction is 60%.  · Normal LV diastolic function.  · Concentric left ventricular remodeling.  · Normal right ventricular systolic function.  · Normal central venous pressure (3 mmHg).  · The estimated PA systolic pressure is 61 mmHg.  · Pulmonary hypertension present.

## 2020-03-10 NOTE — NURSING
Patient voided x 2 since lópez removal. MANI volume due to patient placing wipes inside of bedside commode despite being educated on placing them in garbage can. Seems to have a lot of trouble remembering short term

## 2020-03-10 NOTE — HPI
Notified about a 63yo F with hx of DM HTN who presented from house with cardiac arrest.   Patient was found with likely agonal respirations, pulseless by her son around 0300. Pt was last seen awake about 4 hours prior.   - When EMS arrived, found to be in VF and received shock. No Epi was given. Converted into sinus rhythm.     3/8/2020; Pt seen and examined. This am, the patient has made a good neurologic recovery. Now extubated, alert, oriented at least x 2. I initially planned for urgent coronary angiogram this am now that the patient had recovering neurologic function however she still seems lethargic. She is able to follow commands but frequently forgets the prior conversation. Explained risk and benefits with the patient and family. Believe it is best to wait some more to gain some more neurologic alertness prior to proceeding to [facility].  No chest pain, some sterum soreness at the site of her chest compressions.

## 2020-03-10 NOTE — HOSPITAL COURSE
03/09/2020 s/p LHC:  Moderate LAD stenosis noted with iFR 0.85 and severe OM1 stenosis noted. Successful IVUS guided PCI to mid LAD with OLINDA and post iFR of 0.90. Successful IVUS guided PCI of OM1 with OLINDA, both with excellent results    03/10/2020 Hemodynamically stable overnight. Tolerating DAPT. Statin and BB initiated.

## 2020-03-10 NOTE — NURSING
BG >500 and 449 on POCT. Treated per MAR with long acting insulin 20 units and 5 units short acting. See MAR for details    Page placed to ZEENAT James for further orders. Waiting on return call

## 2020-03-10 NOTE — ASSESSMENT & PLAN NOTE
Ischemic etiology   S/p LHC noting moderate LAD stenosis noted with iFR 0.85 and severe OM1 stenosis noted. Successful IVUS guided PCI to mid LAD with OLINDA and post iFR of 0.90. Successful IVUS guided PCI of OM1 with OLINDA, both with excellent results.    Continue asa, statin, BB, Plavix, and ACEI  Patient appropriate for discharge today after ambulation     Cardiac rehab     Compliance with medication was discussed with particular attention paid to importance of  DAPT for one year without interruption. The risk of abrupt stent occlusion and MI with early discontinuation was specifically stressed. Verbalized understanding and will follow up in the cardiology clinic in 2-3 weeks

## 2020-03-10 NOTE — NURSING
Informed Ryan Welsh NP about PVCs (1 about every 3 beats)--informed that since patient is asymptomatic, patient is ok for discharge.

## 2020-03-10 NOTE — ASSESSMENT & PLAN NOTE
Initial rhythm V fib. 1 shock applied. ROSC within 2 minutes. Time patient was found down to BLS is unknown. Received 80 bicarb and 4 of narcan per EMS. Cardiology following.     Hold TTM 2/2 patient awake and alert, withdraws to pain, however does not follow commands   Reassess neuro status    Continue Heparin infusion   Check TTE   3/9 S/P LHC and 1 OLINDA placed in the LAD OM1 .  Post cath care  Repeat Echo stable

## 2020-03-10 NOTE — DISCHARGE SUMMARY
[facility]  Discharge Summary      Patient Name: Julia Rhoades  MRN: [id number]  Admission Date: 3/8/2020  Hospital Length of Stay: 2 days  Discharge Date and Time: 3/10/2020  1:05 PM  Attending Physician: Piper Samaniego*   Discharging Provider: Piper Samaniego MD  Primary Care Provider: De Pimentel MD      HPI:   Julia Rhoades is a 65 yo female with hypertension, hyperlipidemia, Type 2 Diabetes Mellitus with peripheral neuropathy and peripheral artery disease who presented to ED from home with cardiac arrest. Patient was found pulseless by her son around 0300. Pt was last seen awake about 4 hours prior. When EMS arrived, found to be in VF and received shock followed by sinus rhythm. Initial GCS score of 3. Troponin 0.364. EKG is significant for new incomplete RBBB and st depression in the anterior leads. CT head negative. Cardiology consulted per ED with recommendation for hypothermia protocol, ASA and Heparin infusion NSTEMI protocol. Patient admitted to [facility].     Procedure(s) (LRB):  Angiogram, Coronary, with Left Heart Cath  Fractional Flow [place] (FFR), Coronary  Stent, Drug Eluting, Single Vessel, Coronary  Ultrasound-coronary (N/A)      Hospital Course:   3/9 pt is doing ok after her [facility], she had a stemnosis in the LAD, OM1, got a stent placed, she will need plavix x1 year,aasa vilma and repeat 2 decho and cardiac reab     Consults:   Consults (From admission, onward)        Status Ordering Provider     Inpatient consult to Anesthesiology  Once     Provider:  (Not yet assigned)    Acknowledged ESMER GRAYSON     Inpatient consult to Cardiology  Once     Provider:  Raj Polanco MD    Completed NORBERTO RIVERA     Inpatient consult to Pulmonology  Once     Provider:  (Not yet assigned)    Acknowledged NORBERTO RIVERA          * Cardiac arrest  Initial rhythm V fib. 1 shock applied. ROSC within 2 minutes. Time  patient was found down to BLS is unknown. Received 80 bicarb and 4 of narcan per EMS. Cardiology following.     Hold TTM 2/2 patient awake and alert, withdraws to pain, however does not follow commands   Reassess neuro status    Continue Heparin infusion   Check TTE   3/9 S/P LHC and 1 OLINDA placed in the LAD OM1 .  Post cath care  Repeat Echo stable    Acute respiratory failure  Resolved   Ok to transfer to tele floor      DM (diabetes mellitus)  Peripheral neuropathy    poct glucose q6h, NPO, hypoglycemia protocol       HLD (hyperlipidemia)  continue statin      Hypertension  Hyperlipidemia    Unable to verify home meds at this time   3/9 controled with current meds        Final Active Diagnoses:    Diagnosis Date Noted POA    PRINCIPAL PROBLEM:  Cardiac arrest [I46.9] 03/08/2020 Yes    Coronary artery disease involving native coronary artery of native heart without angina pectoris [I25.10] 03/10/2020 Yes    Acute respiratory failure [J96.00] 03/08/2020 Yes    PAD (peripheral artery disease) [I73.9] 05/08/2014 Yes    Obesity [E66.9] 09/26/2013 Yes    HLD (hyperlipidemia) [E78.5]  Yes    Hypertension [I10]  Yes    DM (diabetes mellitus) [E11.9]  Yes    Peripheral neuropathy [G62.9]  Yes      Problems Resolved During this Admission:       Discharged Condition: stable    Disposition: Home or Self Care    Follow Up:  Follow-up Information     De Pimentel MD In 1 week.    Specialty:  Family Medicine  Contact information:  [address] [facility]  [place] [id number]  [phone]             Magnus Baird MD In 2 weeks.    Specialties:  INTERVENTIONAL CARDIOLOGY, Cardiology  Contact information:  [address]  [address]  [facility] [address]  [phone]                 Patient Instructions:      Ambulatory referral/consult to Cardiac Rehab   Standing Status: Future   Referral Priority: Routine Referral Type: Consultation   Referral Reason: Specialty Services Required   Requested Specialty: Cardiac  "Rehabilitation   Number of Visits Requested: 1     Diet diabetic     Diet Cardiac     Activity as tolerated       Significant Diagnostic Studies:     Pending Diagnostic Studies:     None         Medications:  Reconciled Home Medications:      Medication List      START taking these medications    DANIEL CHEWABLE ASPIRIN 81 MG Chew  Generic drug:  aspirin  Take 1 tablet (81 mg total) by mouth once daily.  Start taking on:  March 11, 2020  Replaces:  aspirin 81 mg Tab     clopidogreL 75 mg tablet  Commonly known as:  PLAVIX  Take 1 tablet (75 mg total) by mouth once daily.  Start taking on:  March 11, 2020     metoprolol succinate 25 MG 24 hr tablet  Commonly known as:  TOPROL-XL  Take 1 tablet (25 mg total) by mouth once daily.  Start taking on:  March 11, 2020     rosuvastatin 10 MG tablet  Commonly known as:  CRESTOR  Take 1 tablet (10 mg total) by mouth every evening.        CHANGE how you take these medications    PEN NEEDLE 31 gauge x 5/16" Ndle  Generic drug:  pen needle, diabetic  What changed:  Another medication with the same name was removed. Continue taking this medication, and follow the directions you see here.        CONTINUE taking these medications    CALTRATE 600 + D ORAL  Take 1 tablet by mouth Twice daily.     chlorthalidone 25 MG Tab  Commonly known as:  HYGROTEN  Take 25 mg by mouth once daily.     ezetimibe 10 mg tablet  Commonly known as:  ZETIA  Take 10 mg by mouth once daily.     insulin glargine 100 unit/mL injection  Commonly known as:  LANTUS  Inject 25 Units into the skin once daily.     insulin syringe-needle U-100 1 mL 31 gauge x 5/16 Syrg  Commonly known as:  BD INSULIN SYRINGE ULT-FINE II  Inject 1 Syringe into the skin 5 (five) times daily.     lisinopriL 40 MG tablet  Commonly known as:  PRINIVIL,ZESTRIL  TAKE ONE TABLET BY MOUTH ONCE DAILY     metFORMIN 500 MG tablet  Commonly known as:  GLUCOPHAGE  Take 1 tablet (500 mg total) by mouth 2 (two) times daily.     NIFEdipine 10 MG " Cap  Commonly known as:  PROCARDIA  Take 30 mg by mouth once daily.        STOP taking these medications    aspirin 81 mg Tab  Replaced by:  DANIEL CHEWABLE ASPIRIN 81 MG Chew            Indwelling Lines/Drains at time of discharge:   Lines/Drains/Airways     None                 Time spent on the discharge of patient: 35 minutes  Patient was seen and examined on the date of discharge and determined to be suitable for discharge.    Critical care time spent on the evaluation and treatment of severe organ dysfunction, review of pertinent labs and imaging studies, discussions with consulting providers and discussions with patient/family: 35 minutes.     Piper Samaniego MD  Department of Hospital Medicine  [facility]

## 2020-03-10 NOTE — SUBJECTIVE & OBJECTIVE
Interval History: feeling better after the cath, S/P 1 OLINDA placed conrtinue ASA, plavix,  Repeat echo stable.   Discharge today.       Review of Systems   Constitutional: Negative for activity change, chills, diaphoresis and fatigue.   Respiratory: Negative for cough and shortness of breath.    Cardiovascular: Negative for chest pain.   Neurological: Negative for dizziness and numbness.     Objective:     Vital Signs (Most Recent):  Temp: 98.6 °F (37 °C) (03/10/20 0745)  Pulse: 93 (03/10/20 0845)  Resp: (!) 28 (03/10/20 0845)  BP: (!) 155/72 (03/10/20 0800)  SpO2: 99 % (03/10/20 0845) Vital Signs (24h Range):  Temp:  [97.9 °F (36.6 °C)-99.1 °F (37.3 °C)] 98.6 °F (37 °C)  Pulse:  [] 93  Resp:  [15-43] 28  SpO2:  [91 %-100 %] 99 %  BP: (109-178)/() 155/72     Weight: 84 kg (185 lb 3 oz)  Body mass index is 30.82 kg/m².    Intake/Output Summary (Last 24 hours) at 3/10/2020 1050  Last data filed at 3/10/2020 0600  Gross per 24 hour   Intake 946.75 ml   Output 975 ml   Net -28.25 ml      Physical Exam   Constitutional: She is oriented to person, place, and time. She appears well-developed and well-nourished.   HENT:   Head: Normocephalic and atraumatic.   Eyes: EOM are normal.   Neck: Normal range of motion. Neck supple.   Cardiovascular: Normal rate.   Pulmonary/Chest: Effort normal. She has no wheezes.   Abdominal: Soft.   Neurological: She is alert and oriented to person, place, and time.   Psychiatric: She has a normal mood and affect. Her behavior is normal. Judgment and thought content normal.       Significant Labs:   Recent Labs   Lab 03/08/20  0326 03/08/20  0840   WBC 9.99 14.80*   HGB 13.3 12.1   HCT 42.3 38.5    241     Recent Labs   Lab 03/08/20  0840 03/08/20  1527 03/09/20  0356 03/09/20  1031 03/10/20  0424    135* 139  --  140   K 2.8* 5.0 2.8* 3.4* 3.2*    100 102  --  105   CO2 25 24 27  --  25   BUN 16 15 13  --  11   CREATININE 1.1 1.0 0.9  --  0.9   * 372* 243*   --  175*   CALCIUM 8.7 9.1 9.2  --  8.8   MG 1.2*  --  1.7  --  1.8   PHOS 2.8  --  3.0  --  1.7*     Recent Labs   Lab 03/08/20  0326 03/08/20  0336   ALKPHOS 132  --    ALT 73*  --    *  --    ALBUMIN 3.0*  --    PROT 6.8  --    BILITOT 0.3  --    INR  --  0.9      Recent Labs     03/08/20  0326 03/08/20  0840 03/08/20  1227   TROPONINI 0.364* 1.559* 1.968*     Recent Labs   Lab 03/09/20  0559 03/09/20  1222 03/09/20  1716 03/09/20  2147 03/09/20  2340 03/10/20  0609   POCTGLUCOSE 239* 277* 353* 449* 338* 170*     Hemoglobin A1C   Date Value Ref Range Status   03/08/2020 8.8 (H) 4.0 - 5.6 % Final     Comment:     ADA Screening Guidelines:  5.7-6.4%  Consistent with prediabetes  >or=6.5%  Consistent with diabetes  High levels of fetal hemoglobin interfere with the HbA1C  assay. Heterozygous hemoglobin variants (HbS, HgC, etc)do  not significantly interfere with this assay.   However, presence of multiple variants may affect accuracy.     11/16/2015 8.7 (H) 4.5 - 6.2 % Final   07/21/2015 8.6 (H) 4.5 - 6.2 % Final     Scheduled Meds:   aspirin  81 mg Oral Daily    clopidogreL  75 mg Oral Daily    insulin detemir U-100  20 Units Subcutaneous QHS    metoprolol succinate  25 mg Oral Daily    rosuvastatin  10 mg Oral QHS     Continuous Infusions:   heparin (porcine)       As Needed: Dextrose 10% Bolus, glucagon (human recombinant), heparin (porcine), insulin aspart U-100, metoprolol, ondansetron, sodium chloride 0.9%    Significant Imaging:   No new imapging\

## 2020-03-10 NOTE — PLAN OF CARE
03/10/20 1144   Final Note   Assessment Type Final Discharge Note   Anticipated Discharge Disposition Home   What phone number can be called within the next 1-3 days to see how you are doing after discharge? [phone]   Hospital Follow Up  Appt(s) scheduled? Yes   Discharge plans and expectations educations in teach back method with documentation complete? Yes   Right Care Referral Info   Post Acute Recommendation No Care

## 2020-03-10 NOTE — PROGRESS NOTES
[facility]  Progress Note    Patient Name: Julia Rhoades  MRN: [id number]  Patient Class: IP- Inpatient   Admission Date: 3/8/2020  Length of Stay: 2 days  Attending Physician: Piper Samaniego*  Primary Care Provider: De Pimentel MD        Subjective:     Principal Problem:Cardiac arrest          HPI:  Julia Rhoades is a 63 yo female with hypertension, hyperlipidemia, Type 2 Diabetes Mellitus with peripheral neuropathy and peripheral artery disease who presented to ED from home with cardiac arrest. Patient was found pulseless by her son around 0300. Pt was last seen awake about 4 hours prior. When EMS arrived, found to be in VF and received shock followed by sinus rhythm. Initial GCS score of 3. Troponin 0.364. EKG is significant for new incomplete RBBB and st depression in the anterior leads. CT head negative. Cardiology consulted per ED with recommendation for hypothermia protocol, ASA and Heparin infusion NSTEMI protocol. Patient admitted to [facility].     Overview/Hospital Course:  3/9 pt is doing ok after her LHC, she had a stemnosis in the LAD, OM1, got a stent placed, she will need plavix x1 year,aasa vilma and repeat 2 decho and cardiac reab    Interval History: feeling better after the cath, S/P 1 OLINDA placed conrtinue ASA, plavix,  Repeat echo stable.   Discharge today.       Review of Systems   Constitutional: Negative for activity change, chills, diaphoresis and fatigue.   Respiratory: Negative for cough and shortness of breath.    Cardiovascular: Negative for chest pain.   Neurological: Negative for dizziness and numbness.     Objective:     Vital Signs (Most Recent):  Temp: 98.6 °F (37 °C) (03/10/20 0745)  Pulse: 93 (03/10/20 0845)  Resp: (!) 28 (03/10/20 0845)  BP: (!) 155/72 (03/10/20 0800)  SpO2: 99 % (03/10/20 0845) Vital Signs (24h Range):  Temp:  [97.9 °F (36.6 °C)-99.1 °F (37.3 °C)] 98.6 °F (37 °C)  Pulse:  []  93  Resp:  [15-43] 28  SpO2:  [91 %-100 %] 99 %  BP: (109-178)/() 155/72     Weight: 84 kg (185 lb 3 oz)  Body mass index is 30.82 kg/m².    Intake/Output Summary (Last 24 hours) at 3/10/2020 1050  Last data filed at 3/10/2020 0600  Gross per 24 hour   Intake 946.75 ml   Output 975 ml   Net -28.25 ml      Physical Exam   Constitutional: She is oriented to person, place, and time. She appears well-developed and well-nourished.   HENT:   Head: Normocephalic and atraumatic.   Eyes: EOM are normal.   Neck: Normal range of motion. Neck supple.   Cardiovascular: Normal rate.   Pulmonary/Chest: Effort normal. She has no wheezes.   Abdominal: Soft.   Neurological: She is alert and oriented to person, place, and time.   Psychiatric: She has a normal mood and affect. Her behavior is normal. Judgment and thought content normal.       Significant Labs:   Recent Labs   Lab 03/08/20 0326 03/08/20  0840   WBC 9.99 14.80*   HGB 13.3 12.1   HCT 42.3 38.5    241     Recent Labs   Lab 03/08/20  0840 03/08/20  1527 03/09/20  0356 03/09/20  1031 03/10/20  0424    135* 139  --  140   K 2.8* 5.0 2.8* 3.4* 3.2*    100 102  --  105   CO2 25 24 27  --  25   BUN 16 15 13  --  11   CREATININE 1.1 1.0 0.9  --  0.9   * 372* 243*  --  175*   CALCIUM 8.7 9.1 9.2  --  8.8   MG 1.2*  --  1.7  --  1.8   PHOS 2.8  --  3.0  --  1.7*     Recent Labs   Lab 03/08/20 0326 03/08/20  0336   ALKPHOS 132  --    ALT 73*  --    *  --    ALBUMIN 3.0*  --    PROT 6.8  --    BILITOT 0.3  --    INR  --  0.9      Recent Labs     03/08/20 0326 03/08/20  0840 03/08/20  1227   TROPONINI 0.364* 1.559* 1.968*     Recent Labs   Lab 03/09/20  0559 03/09/20  1222 03/09/20  1716 03/09/20  2147 03/09/20  2340 03/10/20  0609   POCTGLUCOSE 239* 277* 353* 449* 338* 170*     Hemoglobin A1C   Date Value Ref Range Status   03/08/2020 8.8 (H) 4.0 - 5.6 % Final     Comment:     ADA Screening Guidelines:  5.7-6.4%  Consistent with  prediabetes  >or=6.5%  Consistent with diabetes  High levels of fetal hemoglobin interfere with the HbA1C  assay. Heterozygous hemoglobin variants (HbS, HgC, etc)do  not significantly interfere with this assay.   However, presence of multiple variants may affect accuracy.     11/16/2015 8.7 (H) 4.5 - 6.2 % Final   07/21/2015 8.6 (H) 4.5 - 6.2 % Final     Scheduled Meds:   aspirin  81 mg Oral Daily    clopidogreL  75 mg Oral Daily    insulin detemir U-100  20 Units Subcutaneous QHS    metoprolol succinate  25 mg Oral Daily    rosuvastatin  10 mg Oral QHS     Continuous Infusions:   heparin (porcine)       As Needed: Dextrose 10% Bolus, glucagon (human recombinant), heparin (porcine), insulin aspart U-100, metoprolol, ondansetron, sodium chloride 0.9%    Significant Imaging:   No new imapging\        Assessment/Plan:      * Cardiac arrest  Initial rhythm V fib. 1 shock applied. ROSC within 2 minutes. Time patient was found down to BLS is unknown. Received 80 bicarb and 4 of narcan per EMS. Cardiology following.     Hold TTM 2/2 patient awake and alert, withdraws to pain, however does not follow commands   Reassess neuro status    Continue Heparin infusion   Check TTE   3/9 S/P LHC and 1 OLINDA placed in the LAD OM1 .  Post cath care  Repeat Echo stable    Acute respiratory failure  Resolved   Ok to transfer to tele floor      DM (diabetes mellitus)  Peripheral neuropathy    poct glucose q6h, NPO, hypoglycemia protocol       HLD (hyperlipidemia)  continue statin      Hypertension  Hyperlipidemia    Unable to verify home meds at this time   3/9 controled with current meds        VTE Risk Mitigation (From admission, onward)         Ordered     heparin infusion 1,000 units/500 ml in 0.9% NaCl (pressure line flush)  Intra-op continuous PRN      03/09/20 1052     IP VTE HIGH RISK PATIENT  Once      03/08/20 0835                Critical care time spent on the evaluation and treatment of severe organ dysfunction, review of  pertinent labs and imaging studies, discussions with consulting providers and discussions with patient/family: 35 minutes.      Piper Samaniego MD  Department of Hospital Medicine   [facility]

## 2020-03-10 NOTE — PLAN OF CARE
Problem: Adult Inpatient Plan of Care  Goal: Plan of Care Review  Flowsheets (Taken 3/10/2020 0548)  Plan of Care Reviewed With: patient; spouse     Patient is resting in bed. Awakens to verbal stimuli. Oriented x4. Moves all extremities and follows commands. See flowsheets for details. All lines are intact and fluids are infusing without difficulty. See MAR for gtts. No chest pain overnight. R radial site intact, with no redness, swelling, or hematoma. Bed in lowest position. Patient getting up to bedside commode. Siderails x3. Will continue to monitor.

## 2020-03-10 NOTE — PROGRESS NOTES
[facility]  Cardiology  Progress Note    Patient Name: Julia Rhoades  MRN: [id number]  Admission Date: 3/8/2020  Hospital Length of Stay: 2 days  Code Status: Full Code   Attending Physician: Sam Hamilton DO   Primary Care Physician: De Pimentel MD  Expected Discharge Date:   Principal Problem:Cardiac arrest    Subjective:     Hospital Course:   03/09/2020 s/p LHC:  Moderate LAD stenosis noted with iFR 0.85 and severe OM1 stenosis noted. Successful IVUS guided PCI to mid LAD with OLINDA and post iFR of 0.90. Successful IVUS guided PCI of OM1 with OLINDA, both with excellent results    03/10/2020 Hemodynamically stable overnight. Tolerating DAPT. Statin and BB initiated.         Review of Systems   Constitution: Negative.   HENT: Negative.    Eyes: Negative.    Cardiovascular: Negative for chest pain, irregular heartbeat, leg swelling, near-syncope, orthopnea, palpitations, paroxysmal nocturnal dyspnea and syncope.   Respiratory: Negative for cough and shortness of breath.    Endocrine: Negative.    Hematologic/Lymphatic: Negative.    Skin: Negative.    Musculoskeletal: Positive for myalgias.   Gastrointestinal: Negative.    Genitourinary: Negative.    Neurological: Negative.    Psychiatric/Behavioral: Negative.    Allergic/Immunologic: Negative.      Objective:     Vital Signs (Most Recent):  Temp: 98.6 °F (37 °C) (03/10/20 0745)  Pulse: 88 (03/10/20 0815)  Resp: 20 (03/10/20 0815)  BP: (!) 155/72 (03/10/20 0800)  SpO2: 98 % (03/10/20 0815) Vital Signs (24h Range):  Temp:  [97.7 °F (36.5 °C)-99.1 °F (37.3 °C)] 98.6 °F (37 °C)  Pulse:  [] 88  Resp:  [15-43] 20  SpO2:  [91 %-100 %] 98 %  BP: (109-181)/() 155/72     Weight: 84 kg (185 lb 3 oz)  Body mass index is 30.82 kg/m².     SpO2: 98 %  O2 Device (Oxygen Therapy): room air      Intake/Output Summary (Last 24 hours) at 3/10/2020 0844  Last data filed at 3/10/2020 0600  Gross per 24 hour   Intake 1038.15 ml   Output 1040 ml   Net  -1.85 ml       Lines/Drains/Airways     Peripheral Intravenous Line                 Peripheral IV - Single Lumen 03/08/20 20 G Left Hand 2 days                Physical Exam   Constitutional: She is oriented to person, place, and time. She appears well-developed and well-nourished. No distress.   HENT:   Head: Normocephalic and atraumatic.   Eyes: Right eye exhibits no discharge. Left eye exhibits no discharge.   Neck: No JVD present.   Cardiovascular: Normal rate, regular rhythm and normal heart sounds. Exam reveals no gallop and no friction rub.   No murmur heard.  Pulmonary/Chest: Effort normal and breath sounds normal.   Abdominal: Soft. Bowel sounds are normal.   Musculoskeletal: She exhibits tenderness. She exhibits no edema.   Neurological: She is alert and oriented to person, place, and time.   Skin: Skin is warm and dry. She is not diaphoretic.   Psychiatric: She has a normal mood and affect. Her behavior is normal. Judgment and thought content normal.       Significant Labs:   BMP:   Recent Labs   Lab 03/08/20  1527 03/09/20  0356 03/09/20  1031 03/10/20  0424   * 243*  --  175*   * 139  --  140   K 5.0 2.8* 3.4* 3.2*    102  --  105   CO2 24 27  --  25   BUN 15 13  --  11   CREATININE 1.0 0.9  --  0.9   CALCIUM 9.1 9.2  --  8.8   MG  --  1.7  --  1.8   , CMP   Recent Labs   Lab 03/08/20  1527 03/09/20  0356 03/09/20  1031 03/10/20  0424   * 139  --  140   K 5.0 2.8* 3.4* 3.2*    102  --  105   CO2 24 27  --  25   * 243*  --  175*   BUN 15 13  --  11   CREATININE 1.0 0.9  --  0.9   CALCIUM 9.1 9.2  --  8.8   ANIONGAP 11 10  --  10   ESTGFRAFRICA >60 >60  --  >60   EGFRNONAA >60 >60  --  >60   , CBC No results for input(s): WBC, HGB, HCT, PLT in the last 48 hours., INR No results for input(s): INR, PROTIME in the last 48 hours., Lipid Panel No results for input(s): CHOL, HDL, LDLCALC, TRIG, CHOLHDL in the last 48 hours., Troponin   Recent Labs   Lab 03/08/20  1227    TROPONINI 1.968*    and All pertinent lab results from the last 24 hours have been reviewed.    Significant Imaging: Echocardiogram:   Transthoracic echo (TTE) complete (Cupid Only):   Results for orders placed or performed during the hospital encounter of 03/08/20   Echo Color Flow Doppler? Yes   Result Value Ref Range    BSA 1.97 m2    LA WIDTH 3.77 cm    LVIDD 3.88 3.5 - 6.0 cm    IVS 1.00 0.6 - 1.1 cm    PW 1.00 0.6 - 1.1 cm    Ao root annulus 2.52 cm    LVIDS 2.91 2.1 - 4.0 cm    FS 25 28 - 44 %    LA volume 48.95 cm3    STJ 2.28 cm    Ascending aorta 2.31 cm    LV mass 121.15 g    LA size 3.36 cm    RVDD 2.44 cm    TAPSE 1.90 cm    Left Ventricle Relative Wall Thickness 0.52 cm    AV mean gradient 4 mmHg    AV valve area 2.10 cm2    AV Velocity Ratio 0.58     AV index (prosthetic) 0.87     E/A ratio 0.92     E wave decelartion time 164.30 msec    IVRT 88.49 msec    Pulm vein S/D ratio 1.30     LVOT diameter 1.75 cm    LVOT area 2.4 cm2    LVOT peak francesco 0.77 m/s    LVOT peak VTI 22.00 cm    Ao peak francesco 1.33 m/s    Ao VTI 25.22 cm    LVOT stroke volume 52.89 cm3    AV peak gradient 7 mmHg    MV Peak E Francesco 0.68 m/s    TR Max Francesco 3.82 m/s    MV Peak A Francesco 0.74 m/s    PV Peak S Francesco 0.26 m/s    PV Peak D Francesco 0.20 m/s    LV Systolic Volume 32.46 mL    LV Systolic Volume Index 16.9 mL/m2    LV Diastolic Volume 64.95 mL    LV Diastolic Volume Index 33.86 mL/m2    LA Volume Index 25.5 mL/m2    LV Mass Index 63 g/m2    RA Major Axis 4.27 cm    Left Atrium Minor Axis 4.90 cm    Left Atrium Major Axis 4.24 cm    Triscuspid Valve Regurgitation Peak Gradient 58 mmHg    RA Width 3.66 cm    Right Atrial Pressure (from IVC) 3 mmHg    TV rest pulmonary artery pressure 61 mmHg    Narrative    · Normal left ventricular systolic function. The estimated ejection   fraction is 60%.  · Normal LV diastolic function.  · Concentric left ventricular remodeling.  · Normal right ventricular systolic function.  · Normal central venous  pressure (3 mmHg).  · The estimated PA systolic pressure is 61 mmHg.  · Pulmonary hypertension present.        Assessment and Plan:     Brief HPI: Patient seen this morning on rounds without CV complaint. Compliance with medication was discussed with particular attention paid to importance of  DAPT for one year without interruption. The risk of abrupt stent occlusion and MI with early discontinuation was specifically stressed. Verbalized understanding and will follow up in the cardiology clinic in 2-3 weeks     * Cardiac arrest  Ischemic etiology   S/p LHC noting moderate LAD stenosis noted with iFR 0.85 and severe OM1 stenosis noted. Successful IVUS guided PCI to mid LAD with OLINDA and post iFR of 0.90. Successful IVUS guided PCI of OM1 with OLINDA, both with excellent results.    Continue asa, statin, BB, Plavix, and ACEI  Patient appropriate for discharge today after ambulation     Cardiac rehab     Compliance with medication was discussed with particular attention paid to importance of  DAPT for one year without interruption. The risk of abrupt stent occlusion and MI with early discontinuation was specifically stressed. Verbalized understanding and will follow up in the cardiology clinic in 2-3 weeks     Coronary artery disease involving native coronary artery of native heart without angina pectoris  Per cardiac arrest     PAD (peripheral artery disease)  Stable  Continue asa, statin, ACEI     DM (diabetes mellitus)  A1c 8.8  Management per primary team     HLD (hyperlipidemia)  Crestor initiated     Hypertension  BB initiated, Continue ACEI        VTE Risk Mitigation (From admission, onward)         Ordered     heparin infusion 1,000 units/500 ml in 0.9% NaCl (pressure line flush)  Intra-op continuous PRN      03/09/20 1052     IP VTE HIGH RISK PATIENT  Once      03/08/20 0835                Ryan Welsh NP  Cardiology  [facility]

## 2020-03-10 NOTE — PLAN OF CARE
Patient feeling well this morning. Awake and alert. OLINDA placed to LAD OM1 on 3/9. Patient is on ASA and Plavix. No respiratory distress. No supplemental O2. Pending transfer to the floor.    Theresa Aviles MD  [employer] PCCM Fellow

## 2020-03-10 NOTE — NURSING
Reviewed discharge instructions with patient. Patient stated that she will not take crestor--informed Ryan Welsh NP and she stated that it will be addressed at the clinic at follow up. All questions from patient and family answered. Awaiting discharge via wheelchair to [facility].

## 2020-03-11 ENCOUNTER — TELEPHONE (OUTPATIENT)
Dept: CARDIOLOGY | Facility: CLINIC | Age: 65
End: 2020-03-11

## 2020-03-11 LAB — CATH EF QUANTITATIVE: 65 %

## 2020-03-11 NOTE — TELEPHONE ENCOUNTER
----- Message from [place] sent at 3/10/2020  3:26 PM CDT -----  Contact: Jf/son  [phone]  Type:  Sooner Apoointment Request    Caller is requesting a sooner appointment.  Caller declined first available appointment listed below.  Caller will not accept being placed on the waitlist and is requesting a message be sent to doctor.  Name of Caller:PATIENT SON   When is the first available appointment? 8-4-2020  Symptoms: PROCEDURE F/U  Would the patient rather a call back or a response via [facility]? CALLBACK  Best Call Back Number:[phone]  Additional Information: PATIENT SON STATES THE PATIENT HAS TO BE SEEN WITHIN 2 WEEKS

## 2020-03-11 NOTE — TELEPHONE ENCOUNTER
Pt son, Gilbert, returned my phone call in regards to scheduled clinical hospital f/u appointment with Dr. Baird     [employer] slip mailed out

## 2020-03-11 NOTE — TELEPHONE ENCOUNTER
Reached out to pt     Left detailed message requesting a call back in regards to scheduled hospital f/u appointment for pt

## 2020-03-12 ENCOUNTER — PATIENT OUTREACH (OUTPATIENT)
Dept: ADMINISTRATIVE | Facility: CLINIC | Age: 65
End: 2020-03-12

## 2020-03-12 NOTE — PATIENT INSTRUCTIONS
Coronary Angiography     The catheter can be placed into the groin, arm, or wrist.   Angiography is a special type of X-ray that lets your doctor view your coronary arteries to see if the blood vessels to your heart are narrowed or blocked.   Before the procedure  · Tell your doctor what medicines you take and any allergies you may have.  · Tell your doctor if you've had a reaction to contrast dye or have had any kidney problems.  · Dont eat or drink anything for at least 6 to 8 hours before the procedure. You will likely be told not to have anything after midnight, the night before the procedure.  · A nurse will place an IV catheter in your vein to give fluids, and medicine to relieve pain and help you feel less anxious.  · He or she will clean your skin and, if necessary, shave the area where the catheter will be inserted.  During the procedure  · Your doctor will place a long, thin tube called a catheter inside an artery in your groin or arm and guide it into your heart.  · He or she will inject a contrast dye through the catheter into your blood vessels or heart chambers.  · X-rays are taken to show images of the inside of your heart and coronary arteries.  After the procedure    · Your doctor or nurse will tell you how long to lie down and keep the insertion site still.  · If the insertion site was in your groin, you may need to lie down with your leg still for several hours. If bleeding occurs, a nurse will apply pressure to the area to control it.  · A nurse will check your blood pressure and the insertion site frequently to make sure you remain stable after the procedure.  · You may be asked to drink fluid to help flush the contrast liquid out of your system.  · Have someone drive you home from the hospital.  · If your doctor uses angioplasty to treat a blocked artery, you will stay the night in the hospital.  · Its normal to find a small bruise or lump at the insertion site. The lump may be the collagen  plug or stitch that you feel, or a small bruise. These common side effects should disappear within a few weeks.  When to call your healthcare provider  Contact your healthcare provider right away if you have any of these:  · Chest pain  · Pain, swelling, redness, bleeding, or drainage at the insertion site  · Severe pain, coldness, or a bluish color in the leg or arm that held the catheter  · Fever over 100.4°F (38°C)     © 2000-2020 [employer]. [address] [place] [address]. All rights reserved. This information is not intend as a substitute for professional medical care. Always follow your healthcare professional's instructions.

## 2020-03-17 ENCOUNTER — OFFICE VISIT (OUTPATIENT)
Dept: PRIMARY CARE CLINIC | Facility: CLINIC | Age: 65
End: 2020-03-17
Payer: MEDICAID

## 2020-03-17 VITALS
DIASTOLIC BLOOD PRESSURE: 79 MMHG | BODY MASS INDEX: 30.12 KG/M2 | SYSTOLIC BLOOD PRESSURE: 154 MMHG | HEART RATE: 70 BPM | OXYGEN SATURATION: 98 % | WEIGHT: 181 LBS | TEMPERATURE: 98 F

## 2020-03-17 DIAGNOSIS — E11.69 TYPE 2 DIABETES MELLITUS WITH OTHER SPECIFIED COMPLICATION, WITH LONG-TERM CURRENT USE OF INSULIN: ICD-10-CM

## 2020-03-17 DIAGNOSIS — Z79.4 TYPE 2 DIABETES MELLITUS WITH OTHER SPECIFIED COMPLICATION, WITH LONG-TERM CURRENT USE OF INSULIN: ICD-10-CM

## 2020-03-17 DIAGNOSIS — I46.9 CARDIAC ARREST: Primary | ICD-10-CM

## 2020-03-17 DIAGNOSIS — I25.10 CORONARY ARTERY DISEASE INVOLVING NATIVE CORONARY ARTERY OF NATIVE HEART WITHOUT ANGINA PECTORIS: ICD-10-CM

## 2020-03-17 DIAGNOSIS — I10 ESSENTIAL HYPERTENSION: ICD-10-CM

## 2020-03-17 PROBLEM — J96.00 ACUTE RESPIRATORY FAILURE: Status: RESOLVED | Noted: 2020-03-08 | Resolved: 2020-03-17

## 2020-03-17 PROCEDURE — 99496 TRANSJ CARE MGMT HIGH F2F 7D: CPT | Mod: S$PBB,,, | Performed by: INTERNAL MEDICINE

## 2020-03-17 PROCEDURE — 99999 PR PBB SHADOW E&M-EST. PATIENT-LVL III: ICD-10-PCS | Mod: PBBFAC,,, | Performed by: INTERNAL MEDICINE

## 2020-03-17 PROCEDURE — 99999 PR PBB SHADOW E&M-EST. PATIENT-LVL III: CPT | Mod: PBBFAC,,, | Performed by: INTERNAL MEDICINE

## 2020-03-17 PROCEDURE — 99496 TRANSITIONAL CARE MANAGE SERVICE 7 DAY DISCHARGE: ICD-10-PCS | Mod: S$PBB,,, | Performed by: INTERNAL MEDICINE

## 2020-03-17 PROCEDURE — 99496 TRANSJ CARE MGMT HIGH F2F 7D: CPT | Mod: PBBFAC,PO | Performed by: INTERNAL MEDICINE

## 2020-03-17 PROCEDURE — 99213 OFFICE O/P EST LOW 20 MIN: CPT | Mod: PBBFAC,PO | Performed by: INTERNAL MEDICINE

## 2020-03-17 RX ORDER — METFORMIN HYDROCHLORIDE 500 MG/1
500 TABLET ORAL DAILY
Qty: 180 TABLET | Refills: 4
Start: 2020-03-17

## 2020-03-17 RX ORDER — INSULIN GLARGINE 100 [IU]/ML
14 INJECTION, SOLUTION SUBCUTANEOUS 2 TIMES DAILY
Qty: 10 ML | Refills: 6 | Status: SHIPPED | OUTPATIENT
Start: 2020-03-17

## 2020-03-17 RX ORDER — NIFEDIPINE 30 MG/1
30 TABLET, FILM COATED, EXTENDED RELEASE ORAL 2 TIMES DAILY
Qty: 60 TABLET | Refills: 11 | Status: SHIPPED | OUTPATIENT
Start: 2020-03-17 | End: 2020-03-17

## 2020-03-17 RX ORDER — NIFEDIPINE 30 MG/1
30 TABLET, FILM COATED, EXTENDED RELEASE ORAL DAILY
Qty: 30 TABLET | Refills: 11 | Status: SHIPPED | OUTPATIENT
Start: 2020-03-17 | End: 2021-05-07

## 2020-03-17 RX ORDER — METOPROLOL SUCCINATE 50 MG/1
50 TABLET, EXTENDED RELEASE ORAL DAILY
Qty: 30 TABLET | Refills: 11 | Status: SHIPPED | OUTPATIENT
Start: 2020-03-17 | End: 2021-05-07

## 2020-03-17 NOTE — PROGRESS NOTES
[facility]   New Visit Progress Note   Recent Hospital Discharge     PRESENTING HISTORY     Chief Complaint/Reason for Admission:  Follow up Hospital Discharge   PCP: De Pimentel MD    History of Present Illness:  Ms. Julia Rhoades is a 64 y.o. female who was recently admitted to the hospital.    [facility]  Discharge Summary        Patient Name: Julia Rhoades  MRN: [id number]  Admission Date: 3/8/2020  Hospital Length of Stay: 2 days  Discharge Date and Time: 3/10/2020  1:05 PM  Attending Physician: Piper Samaniego*   Discharging Provider: Piper Samaniego MD  Primary Care Provider: De Pimentel MD  ___________________________________________________________________    Today:  [facility] to [facility] for initial hospital follow up.  Recently hospitalized following cardiac arrest at home.  Son found her pulseless at 3 AM. Last seen four hours prior. Unknown down time.   Upon EMS arrival, was found to be in VF, successfully defibrillated in the field and achieved sinus rhythm.   Initial GCS score of 3.  Hypothermia protocol instituted upon arrival to ED.   Underwent LHC on 3/9 with OLINDA placed to LAD and OM1.   Medical therapy maximized.  Patient recovered well with above interventions and supportive care.   Discharged to home.     Patient unaccompanied today.  Ambulatory and independent with ADL's.  Reports compliance with all medication, although there are many discrepancies in her medication regimen\ and these were addressed today.    Patient confused about insulin dosing and is using Novolog and Lantus interchangeably; also dosing Lantus on a sliding scale.  Having symptomatic hypoglycemic episodes, more frequently at night.     Review of Systems  General ROS: negative for chills, fever or weight loss  + fatigue   Psychological ROS: negative for hallucination, depression or suicidal ideation  Ophthalmic ROS: negative for blurry vision,  photophobia or eye pain  ENT ROS: negative for epistaxis, sore throat or rhinorrhea  Respiratory ROS: no cough, shortness of breath, or wheezing  Cardiovascular ROS: no chest pain or dyspnea on exertion  + sternal pain, worse with deep inspiration   Gastrointestinal ROS: no abdominal pain, change in bowel habits, or black/ bloody stools  Genito-Urinary ROS: no dysuria, trouble voiding, or hematuria  Musculoskeletal ROS: negative for gait disturbance or muscular weakness  Neurological ROS: no syncope or seizures; no ataxia  Dermatological ROS: negative for pruritis, rash and jaundice    PAST HISTORY:     Past Medical History:   Diagnosis Date    Diabetes mellitus type II     DM (diabetes mellitus)     HLD (hyperlipidemia)     Hyperlipidemia     Hypertension     Obesity     Peripheral neuropathy        Past Surgical History:   Procedure Laterality Date    ANGIOGRAM, CORONARY, WITH LEFT HEART CATHETERIZATION  3/9/2020    Procedure: Angiogram, Coronary, with Left Heart Cath;  Surgeon: Magnus Baird MD;  Location: [facility] CATH LAB/EP;  Service: Cardiology;;    BACK SURGERY      HYSTERECTOMY         Family History   Problem Relation Age of Onset    Diabetes Mother     Diabetes Sister     Diabetes Brother     Diabetes Sister     Diabetes Sister     Diabetes Sister     Diabetes Sister     Hypertension Unknown     Coronary artery disease Neg Hx     Cancer Neg Hx          MEDICATIONS & ALLERGIES:     Current Outpatient Medications on File Prior to Visit   Medication Sig Dispense Refill    aspirin 81 MG Chew Take 1 tablet (81 mg total) by mouth once daily. 30 tablet 11    CALCIUM CARBONATE/VITAMIN D3 (CALTRATE 600 + D ORAL) Take 1 tablet by mouth Twice daily.      chlorthalidone (HYGROTEN) 25 MG Tab Take 25 mg by mouth once daily.      clopidogreL (PLAVIX) 75 mg tablet Take 1 tablet (75 mg total) by mouth once daily. 30 tablet 11    ezetimibe (ZETIA) 10 mg tablet Take 10 mg by mouth once daily.       "insulin glargine (LANTUS) 100 unit/mL injection Inject 25 Units into the skin once daily.      insulin syringe-needle U-100 (BD INSULIN SYRINGE ULT-FINE II) 1 mL 31 x 5/16" Syrg Inject 1 Syringe into the skin 5 (five) times daily. 500 each 11    lisinopril (PRINIVIL,ZESTRIL) 40 MG tablet TAKE ONE TABLET BY MOUTH ONCE DAILY 90 tablet 3    metformin (GLUCOPHAGE) 500 MG tablet Take 1 tablet (500 mg total) by mouth 2 (two) times daily. 180 tablet 04    metoprolol succinate (TOPROL-XL) 25 MG 24 hr tablet Take 1 tablet (25 mg total) by mouth once daily. 30 tablet 11    NIFEdipine (PROCARDIA) 10 MG Cap Take 30 mg by mouth once daily.      PEN NEEDLE 31 X 5/16 " Ndle       rosuvastatin (CRESTOR) 10 MG tablet Take 1 tablet (10 mg total) by mouth every evening. (Patient not taking: Reported on 3/12/2020) 90 tablet 3     No current facility-administered medications on file prior to visit.         Review of patient's allergies indicates:   Allergen Reactions    Januvia [sitagliptin]     Lipitor [atorvastatin]      Shoulder pain    Norvasc [amlodipine]      Weak, dizzy    Oxycodone-acetaminophen     Hydralazine analogues Anxiety       OBJECTIVE:     Vital Signs:  BP (!) 154/79 (BP Location: Right arm, Patient Position: Sitting, BP Method: Small (Automatic))   Pulse 70   Temp 98 °F (36.7 °C) (Oral)   Wt 82.1 kg (181 lb)   LMP  (LMP Unknown)   SpO2 98%   BMI 30.12 kg/m²   Wt Readings from Last 1 Encounters:   03/10/20 0600 84 kg (185 lb 3 oz)   03/09/20 1445 84.4 kg (186 lb)   03/08/20 0545 84.5 kg (186 lb 4.6 oz)   03/08/20 0341 90.7 kg (200 lb)     Body mass index is 30.12 kg/m².        Physical Exam:  BP (!) 154/79 (BP Location: Right arm, Patient Position: Sitting, BP Method: Small (Automatic))   Pulse 70   Temp 98 °F (36.7 °C) (Oral)   Wt 82.1 kg (181 lb)   LMP  (LMP Unknown)   SpO2 98%   BMI 30.12 kg/m²   General appearance: alert, cooperative, no distress  Constitutional:Oriented to person, place, " and time  + appears well-developed and well-nourished.   HEENT: Normocephalic, atraumatic, neck symmetrical, no nasal discharge   Eyes: conjunctivae/corneas clear, PERRL, EOM's intact  Lungs: clear to auscultation bilaterally, no dullness to percussion bilaterally  Heart: regular rate and rhythm without rub; no displacement of the PMI   Abdomen: soft, non-tender; bowel sounds normoactive; no organomegaly  Extremities: extremities symmetric; no clubbing, cyanosis, or edema  Integument: Skin color, texture, turgor normal; no rashes; hair distrubution normal  Neurologic: Alert and oriented X 3, normal strength, normal coordination and gait  Psychiatric: no pressured speech; normal affect; no evidence of impaired cognition     Laboratory  Lab Results   Component Value Date    WBC 14.80 (H) 03/08/2020    HGB 12.1 03/08/2020    HCT 38.5 03/08/2020    MCV 80 (L) 03/08/2020     03/08/2020     BMP  Lab Results   Component Value Date     03/10/2020    K 3.2 (L) 03/10/2020     03/10/2020    CO2 25 03/10/2020    BUN 11 03/10/2020    CREATININE 0.9 03/10/2020    CALCIUM 8.8 03/10/2020    ANIONGAP 10 03/10/2020    ESTGFRAFRICA >60 03/10/2020    EGFRNONAA >60 03/10/2020     Lab Results   Component Value Date    ALT 73 (H) 03/08/2020     (H) 03/08/2020    ALKPHOS 132 03/08/2020    BILITOT 0.3 03/08/2020     Lab Results   Component Value Date    INR 0.9 03/08/2020    INR 0.9 07/03/2014     Lab Results   Component Value Date    HGBA1C 8.8 (H) 03/08/2020       Diagnostic Results:  2 D echo 3/9/20:  · Normal left ventricular systolic function. The estimated ejection fraction is 60%.  · Normal LV diastolic function.  · Concentric left ventricular remodeling.  · Normal right ventricular systolic function.  · Normal central venous pressure (3 mmHg).  · The estimated PA systolic pressure is 61 mmHg.  · Pulmonary hypertension present.      TRANSITION OF CARE:     [facility] Contact Note: 3/12/20.     Family  and/or Caretaker present at visit?  Yes.  Diagnostic tests reviewed/disposition: I have reviewed all completed as well as pending diagnostic tests at the time of discharge.  Disease/illness education: Yes   Home health/community services discussion/referrals: Patient does not have home health established from hospital visit.  They do not need home health.  If needed, we will set up home health for the patient.   Establishment or re-establishment of referral orders for community resources: No other necessary community resources.   Discussion with other health care providers: No discussion with other health care providers necessary.     ASSESSMENT & PLAN:     Cardiac arrest  Coronary artery disease involving native coronary artery of native heart without angina pectoris  - cardiac arrest at home- ischemic etiology; responded well to EMS resuscitation, hypothermic protocol in hospital, and C with OLINDA x 2  - on appropriate medical therapy presently  - has recovered to her baseline cognitive and functional status  - has cardiology follow up- Dr Baird- 3/24/20    Type 2 diabetes mellitus with other specified complication, with long-term current use of insulin  - HgBA1C 8.8  - poor understanding of insulin regimen- using Lantus on sliding scale as well as Novolog  - hold Novolog for now  - Lantus 14 Units twice daily, bring home blood glucose log to next week visit for review  - education and counseling provided   -     Ambulatory referral/consult to Diabetes Education; Future; Expected date: 03/24/2020  -     insulin glargine (LANTUS) 100 unit/mL injection; Inject 14 Units into the skin 2 (two) times daily.  Dispense: 10 mL; Refill: 6  -     metFORMIN (GLUCOPHAGE) 500 MG tablet; Take 1 tablet (500 mg total) by mouth once daily.  Dispense: 180 tablet; Refill: 04    Essential hypertension  - not at goal, increase Toprol XL and monitor  - multiple medication discrepancies noted today, bottles reviewed in detail, Epic  updated   -     metoprolol succinate (TOPROL-XL) 50 MG 24 hr tablet; Take 1 tablet (50 mg total) by mouth once daily.  Dispense: 30 tablet; Refill: 11  -     NIFEdipine (ADALAT CC) 30 MG TbSR; Take 1 tablet (30 mg total) by mouth once daily.  Dispense: 30 tablet; Refill: 11    [facility] will [facility] 3[facility].  She is to bring home blood glucose log.  She has been encouraged to complete Patient Portal enrollment to facilitate telemedicine visits should the need arise.     Instructions for the patient:      Scheduled Follow-up :  Future Appointments   Date Time Provider Department Center   3/24/2020 11:20 AM Magnus Baird MD [facility] CARDIO [facility]   3/24/2020  1:00 PM Shira Mccray MD [facility] IMPRI [facility]       Post Visit Medication List:     Medication List           Accurate as of March 17, 2020 12:18 PM. If you have any questions, ask your nurse or doctor.               START taking these medications    NIFEdipine 30 MG Tbsr  Commonly known as:  ADALAT CC  Take 1 tablet (30 mg total) by mouth once daily.  Replaces:  NIFEdipine 10 MG Cap  Started by:  Shira Mccray MD        CHANGE how you take these medications    insulin glargine 100 unit/mL injection  Commonly known as:  LANTUS  Inject 14 Units into the skin 2 (two) times daily.  What changed:    · how much to take  · when to take this  Changed by:  Shira Mccray MD     metFORMIN 500 MG tablet  Commonly known as:  GLUCOPHAGE  Take 1 tablet (500 mg total) by mouth once daily.  What changed:  when to take this  Changed by:  Shira Mccray MD     metoprolol succinate 50 MG 24 hr tablet  Commonly known as:  TOPROL-XL  Take 1 tablet (50 mg total) by mouth once daily.  What changed:    · medication strength  · how much to take  Changed by:  Shira Mccray MD        CONTINUE taking these medications    DANIEL CHEWABLE ASPIRIN 81 MG Chew  Generic drug:  aspirin  Take 1 tablet (81 mg total) by mouth once daily.    "  CALTRATE 600 + D ORAL     chlorthalidone 25 MG Tab  Commonly known as:  HYGROTEN     clopidogreL 75 mg tablet  Commonly known as:  PLAVIX  Take 1 tablet (75 mg total) by mouth once daily.     ezetimibe 10 mg tablet  Commonly known as:  ZETIA     insulin syringe-needle U-100 1 mL 31 gauge x 5/16 Syrg  Commonly known as:  BD INSULIN SYRINGE ULT-FINE II  Inject 1 Syringe into the skin 5 (five) times daily.     lisinopriL 40 MG tablet  Commonly known as:  PRINIVIL,ZESTRIL  TAKE ONE TABLET BY MOUTH ONCE DAILY     MAGNESIUM CARBONATE ORAL     PEN NEEDLE 31 gauge x 5/16" Ndle  Generic drug:  pen needle, diabetic     rosuvastatin 10 MG tablet  Commonly known as:  CRESTOR  Take 1 tablet (10 mg total) by mouth every evening.        STOP taking these medications    NIFEdipine 10 MG Cap  Commonly known as:  PROCARDIA  Replaced by:  NIFEdipine 30 MG Tbsr  Stopped by:  Shira Mccray MD           Where to Get Your Medications      These medications were sent to [facility] [place] [facility] - [address]  [address] [address]    Phone:  [phone]   · insulin glargine 100 unit/mL injection  · metoprolol succinate 50 MG 24 hr tablet  · NIFEdipine 30 MG Tbsr     Information about where to get these medications is not yet available    Ask your nurse or doctor about these medications  · metFORMIN 500 MG tablet         Signing Physician:  Shira Mccray MD  "

## 2020-03-18 ENCOUNTER — TELEPHONE (OUTPATIENT)
Dept: CARDIAC REHAB | Facility: CLINIC | Age: 65
End: 2020-03-18

## 2020-03-19 ENCOUNTER — TELEPHONE (OUTPATIENT)
Dept: CARDIOLOGY | Facility: HOSPITAL | Age: 65
End: 2020-03-19

## 2020-03-19 NOTE — TELEPHONE ENCOUNTER
----- Message from Herlinda Miller sent at 3/19/2020 10:40 AM CDT -----  Contact: Pt called   Pt would like to schedule an appt for Cardiac Rehab. Please call pt @ [phone]. Thank you.

## 2020-03-20 ENCOUNTER — TELEPHONE (OUTPATIENT)
Dept: CARDIOLOGY | Facility: CLINIC | Age: 65
End: 2020-03-20

## 2020-03-20 NOTE — TELEPHONE ENCOUNTER
Reached out to pt in regards to rescheduling clinical visit with Dr. Baird due to COVID-19 concerns

## 2020-03-25 ENCOUNTER — TELEPHONE (OUTPATIENT)
Dept: DIABETES | Facility: CLINIC | Age: 65
End: 2020-03-25

## 2020-03-26 ENCOUNTER — TELEPHONE (OUTPATIENT)
Dept: DIABETES | Facility: CLINIC | Age: 65
End: 2020-03-26

## 2020-03-30 ENCOUNTER — TELEPHONE (OUTPATIENT)
Dept: CARDIOLOGY | Facility: HOSPITAL | Age: 65
End: 2020-03-30

## 2020-03-30 DIAGNOSIS — I21.4 NSTEMI (NON-ST ELEVATED MYOCARDIAL INFARCTION): Primary | ICD-10-CM

## 2020-04-20 ENCOUNTER — TELEPHONE (OUTPATIENT)
Dept: DIABETES | Facility: CLINIC | Age: 65
End: 2020-04-20

## 2020-04-21 ENCOUNTER — TELEPHONE (OUTPATIENT)
Dept: DIABETES | Facility: CLINIC | Age: 65
End: 2020-04-21

## 2021-04-26 ENCOUNTER — OFFICE VISIT (OUTPATIENT)
Dept: OBSTETRICS AND GYNECOLOGY | Facility: CLINIC | Age: 66
End: 2021-04-26
Payer: MEDICARE

## 2021-04-26 VITALS
BODY MASS INDEX: 31.19 KG/M2 | HEIGHT: 65 IN | WEIGHT: 187.19 LBS | SYSTOLIC BLOOD PRESSURE: 160 MMHG | DIASTOLIC BLOOD PRESSURE: 100 MMHG

## 2021-04-26 DIAGNOSIS — Z01.419 ROUTINE GYNECOLOGICAL EXAMINATION: Primary | ICD-10-CM

## 2021-04-26 PROCEDURE — 1101F PR PT FALLS ASSESS DOC 0-1 FALLS W/OUT INJ PAST YR: ICD-10-PCS | Mod: CPTII,S$GLB,, | Performed by: OBSTETRICS & GYNECOLOGY

## 2021-04-26 PROCEDURE — 3288F FALL RISK ASSESSMENT DOCD: CPT | Mod: CPTII,S$GLB,, | Performed by: OBSTETRICS & GYNECOLOGY

## 2021-04-26 PROCEDURE — 99999 PR PBB SHADOW E&M-EST. PATIENT-LVL III: CPT | Mod: PBBFAC,,, | Performed by: OBSTETRICS & GYNECOLOGY

## 2021-04-26 PROCEDURE — 1126F AMNT PAIN NOTED NONE PRSNT: CPT | Mod: S$GLB,,, | Performed by: OBSTETRICS & GYNECOLOGY

## 2021-04-26 PROCEDURE — G0101 CA SCREEN;PELVIC/BREAST EXAM: HCPCS | Mod: S$GLB,,, | Performed by: OBSTETRICS & GYNECOLOGY

## 2021-04-26 PROCEDURE — 1101F PT FALLS ASSESS-DOCD LE1/YR: CPT | Mod: CPTII,S$GLB,, | Performed by: OBSTETRICS & GYNECOLOGY

## 2021-04-26 PROCEDURE — G0101 PR CA SCREEN;PELVIC/BREAST EXAM: ICD-10-PCS | Mod: S$GLB,,, | Performed by: OBSTETRICS & GYNECOLOGY

## 2021-04-26 PROCEDURE — 1126F PR PAIN SEVERITY QUANTIFIED, NO PAIN PRESENT: ICD-10-PCS | Mod: S$GLB,,, | Performed by: OBSTETRICS & GYNECOLOGY

## 2021-04-26 PROCEDURE — 99999 PR PBB SHADOW E&M-EST. PATIENT-LVL III: ICD-10-PCS | Mod: PBBFAC,,, | Performed by: OBSTETRICS & GYNECOLOGY

## 2021-04-26 PROCEDURE — 3008F BODY MASS INDEX DOCD: CPT | Mod: CPTII,S$GLB,, | Performed by: OBSTETRICS & GYNECOLOGY

## 2021-04-26 PROCEDURE — 3288F PR FALLS RISK ASSESSMENT DOCUMENTED: ICD-10-PCS | Mod: CPTII,S$GLB,, | Performed by: OBSTETRICS & GYNECOLOGY

## 2021-04-26 PROCEDURE — 3008F PR BODY MASS INDEX (BMI) DOCUMENTED: ICD-10-PCS | Mod: CPTII,S$GLB,, | Performed by: OBSTETRICS & GYNECOLOGY

## 2021-05-07 ENCOUNTER — OFFICE VISIT (OUTPATIENT)
Dept: CARDIOLOGY | Facility: CLINIC | Age: 66
End: 2021-05-07
Payer: MEDICARE

## 2021-05-07 VITALS
OXYGEN SATURATION: 96 % | HEART RATE: 70 BPM | BODY MASS INDEX: 31.09 KG/M2 | WEIGHT: 186.63 LBS | DIASTOLIC BLOOD PRESSURE: 84 MMHG | HEIGHT: 65 IN | SYSTOLIC BLOOD PRESSURE: 168 MMHG

## 2021-05-07 DIAGNOSIS — I25.10 CORONARY ARTERY DISEASE INVOLVING NATIVE CORONARY ARTERY OF NATIVE HEART WITHOUT ANGINA PECTORIS: ICD-10-CM

## 2021-05-07 DIAGNOSIS — I10 HYPERTENSION, UNSPECIFIED TYPE: ICD-10-CM

## 2021-05-07 DIAGNOSIS — I49.01 VENTRICULAR FIBRILLATION: Primary | ICD-10-CM

## 2021-05-07 PROCEDURE — 1101F PR PT FALLS ASSESS DOC 0-1 FALLS W/OUT INJ PAST YR: ICD-10-PCS | Mod: CPTII,S$GLB,, | Performed by: INTERNAL MEDICINE

## 2021-05-07 PROCEDURE — 3008F BODY MASS INDEX DOCD: CPT | Mod: CPTII,S$GLB,, | Performed by: INTERNAL MEDICINE

## 2021-05-07 PROCEDURE — 1126F AMNT PAIN NOTED NONE PRSNT: CPT | Mod: S$GLB,,, | Performed by: INTERNAL MEDICINE

## 2021-05-07 PROCEDURE — 99999 PR PBB SHADOW E&M-EST. PATIENT-LVL IV: CPT | Mod: PBBFAC,,, | Performed by: INTERNAL MEDICINE

## 2021-05-07 PROCEDURE — 99204 OFFICE O/P NEW MOD 45 MIN: CPT | Mod: S$GLB,,, | Performed by: INTERNAL MEDICINE

## 2021-05-07 PROCEDURE — 3288F FALL RISK ASSESSMENT DOCD: CPT | Mod: CPTII,S$GLB,, | Performed by: INTERNAL MEDICINE

## 2021-05-07 PROCEDURE — 99999 PR PBB SHADOW E&M-EST. PATIENT-LVL IV: ICD-10-PCS | Mod: PBBFAC,,, | Performed by: INTERNAL MEDICINE

## 2021-05-07 PROCEDURE — 3008F PR BODY MASS INDEX (BMI) DOCUMENTED: ICD-10-PCS | Mod: CPTII,S$GLB,, | Performed by: INTERNAL MEDICINE

## 2021-05-07 PROCEDURE — 3288F PR FALLS RISK ASSESSMENT DOCUMENTED: ICD-10-PCS | Mod: CPTII,S$GLB,, | Performed by: INTERNAL MEDICINE

## 2021-05-07 PROCEDURE — 1126F PR PAIN SEVERITY QUANTIFIED, NO PAIN PRESENT: ICD-10-PCS | Mod: S$GLB,,, | Performed by: INTERNAL MEDICINE

## 2021-05-07 PROCEDURE — 99204 PR OFFICE/OUTPT VISIT, NEW, LEVL IV, 45-59 MIN: ICD-10-PCS | Mod: S$GLB,,, | Performed by: INTERNAL MEDICINE

## 2021-05-07 PROCEDURE — 1101F PT FALLS ASSESS-DOCD LE1/YR: CPT | Mod: CPTII,S$GLB,, | Performed by: INTERNAL MEDICINE

## 2021-05-11 ENCOUNTER — HOSPITAL ENCOUNTER (OUTPATIENT)
Dept: CARDIOLOGY | Facility: HOSPITAL | Age: 66
Discharge: HOME | End: 2021-05-11
Attending: INTERNAL MEDICINE
Payer: MEDICARE

## 2021-05-11 VITALS — HEIGHT: 65 IN | BODY MASS INDEX: 31.16 KG/M2 | WEIGHT: 187 LBS

## 2021-05-11 DIAGNOSIS — I49.01 VENTRICULAR FIBRILLATION: ICD-10-CM

## 2021-05-11 DIAGNOSIS — I25.10 CORONARY ARTERY DISEASE INVOLVING NATIVE CORONARY ARTERY OF NATIVE HEART WITHOUT ANGINA PECTORIS: ICD-10-CM

## 2021-05-11 DIAGNOSIS — I10 HYPERTENSION, UNSPECIFIED TYPE: ICD-10-CM

## 2021-05-11 PROCEDURE — 93306 ECHO (CUPID ONLY): ICD-10-PCS | Mod: 26,,, | Performed by: INTERNAL MEDICINE

## 2021-05-11 PROCEDURE — 93306 TTE W/DOPPLER COMPLETE: CPT

## 2021-05-11 PROCEDURE — 93306 TTE W/DOPPLER COMPLETE: CPT | Mod: 26,,, | Performed by: INTERNAL MEDICINE

## 2021-05-12 LAB
AV INDEX (PROSTH): 0.68
AV MEAN GRADIENT: 5 MMHG
AV PEAK GRADIENT: 10 MMHG
AV VELOCITY RATIO: 0.66
BSA FOR ECHO PROCEDURE: 1.97 M2
CV ECHO LV RWT: 0.47 CM
DOP CALC AO PEAK VEL: 1.57 M/S
DOP CALC AO VTI: 35.65 CM
DOP CALC LVOT PEAK VEL: 1.04 M/S
DOP CALCLVOT PEAK VEL VTI: 24.23 CM
E WAVE DECELERATION TIME: 212.06 MSEC
E/A RATIO: 1.54
E/E' RATIO: 12.93 M/S
ECHO LV POSTERIOR WALL: 0.94 CM (ref 0.6–1.1)
EJECTION FRACTION: 60 %
FRACTIONAL SHORTENING: 36 % (ref 28–44)
INTERVENTRICULAR SEPTUM: 1.11 CM (ref 0.6–1.1)
IVRT: 65.65 MSEC
LA MAJOR: 4.78 CM
LA MINOR: 4.87 CM
LA WIDTH: 3.17 CM
LEFT ATRIUM SIZE: 3.58 CM
LEFT ATRIUM VOLUME INDEX MOD: 13.3 ML/M2
LEFT ATRIUM VOLUME INDEX: 24.2 ML/M2
LEFT ATRIUM VOLUME MOD: 25.52 CM3
LEFT ATRIUM VOLUME: 46.54 CM3
LEFT INTERNAL DIMENSION IN SYSTOLE: 2.56 CM (ref 2.1–4)
LEFT VENTRICLE DIASTOLIC VOLUME INDEX: 37.04 ML/M2
LEFT VENTRICLE DIASTOLIC VOLUME: 71.12 ML
LEFT VENTRICLE MASS INDEX: 69 G/M2
LEFT VENTRICLE SYSTOLIC VOLUME INDEX: 12.4 ML/M2
LEFT VENTRICLE SYSTOLIC VOLUME: 23.75 ML
LEFT VENTRICULAR INTERNAL DIMENSION IN DIASTOLE: 4.03 CM (ref 3.5–6)
LEFT VENTRICULAR MASS: 133.14 G
LV LATERAL E/E' RATIO: 10.78 M/S
LV SEPTAL E/E' RATIO: 16.17 M/S
MV MEAN GRADIENT: 1 MMHG
MV PEAK A VEL: 0.63 M/S
MV PEAK E VEL: 0.97 M/S
MV PEAK GRADIENT: 4 MMHG
MV STENOSIS PRESSURE HALF TIME: 61.5 MS
MV VALVE AREA P 1/2 METHOD: 3.58 CM2
PISA MRMAX VEL: 0.05 M/S
PISA TR MAX VEL: 2.15 M/S
PULM VEIN S/D RATIO: 1.2
PV PEAK D VEL: 0.45 M/S
PV PEAK S VEL: 0.54 M/S
PV PEAK VELOCITY: 1.21 CM/S
RA MAJOR: 4.16 CM
RA PRESSURE: 3 MMHG
RA WIDTH: 3.22 CM
RV TISSUE DOPPLER FREE WALL SYSTOLIC VELOCITY 1 (APICAL 4 CHAMBER VIEW): 12.54 CM/S
TDI LATERAL: 0.09 M/S
TDI SEPTAL: 0.06 M/S
TDI: 0.08 M/S
TR MAX PG: 18 MMHG
TRICUSPID ANNULAR PLANE SYSTOLIC EXCURSION: 2.3 CM
TV REST PULMONARY ARTERY PRESSURE: 21 MMHG

## 2022-06-01 ENCOUNTER — OFFICE VISIT (OUTPATIENT)
Dept: CARDIOLOGY | Facility: CLINIC | Age: 67
End: 2022-06-01
Payer: MEDICARE

## 2022-06-01 VITALS
DIASTOLIC BLOOD PRESSURE: 81 MMHG | BODY MASS INDEX: 31.18 KG/M2 | HEART RATE: 66 BPM | WEIGHT: 187.38 LBS | OXYGEN SATURATION: 98 % | SYSTOLIC BLOOD PRESSURE: 143 MMHG

## 2022-06-01 DIAGNOSIS — E66.9 CLASS 1 OBESITY WITH SERIOUS COMORBIDITY AND BODY MASS INDEX (BMI) OF 30.0 TO 30.9 IN ADULT, UNSPECIFIED OBESITY TYPE: ICD-10-CM

## 2022-06-01 DIAGNOSIS — I73.9 PAD (PERIPHERAL ARTERY DISEASE): ICD-10-CM

## 2022-06-01 DIAGNOSIS — I10 PRIMARY HYPERTENSION: ICD-10-CM

## 2022-06-01 DIAGNOSIS — E78.2 MIXED HYPERLIPIDEMIA: Primary | ICD-10-CM

## 2022-06-01 DIAGNOSIS — E11.69 TYPE 2 DIABETES MELLITUS WITH OTHER SPECIFIED COMPLICATION, WITH LONG-TERM CURRENT USE OF INSULIN: ICD-10-CM

## 2022-06-01 DIAGNOSIS — Z79.4 TYPE 2 DIABETES MELLITUS WITH OTHER SPECIFIED COMPLICATION, WITH LONG-TERM CURRENT USE OF INSULIN: ICD-10-CM

## 2022-06-01 DIAGNOSIS — I25.10 CORONARY ARTERY DISEASE INVOLVING NATIVE CORONARY ARTERY OF NATIVE HEART WITHOUT ANGINA PECTORIS: ICD-10-CM

## 2022-06-01 PROCEDURE — 3008F PR BODY MASS INDEX (BMI) DOCUMENTED: ICD-10-PCS | Mod: CPTII,S$GLB,, | Performed by: INTERNAL MEDICINE

## 2022-06-01 PROCEDURE — 3077F SYST BP >= 140 MM HG: CPT | Mod: CPTII,S$GLB,, | Performed by: INTERNAL MEDICINE

## 2022-06-01 PROCEDURE — 1126F AMNT PAIN NOTED NONE PRSNT: CPT | Mod: CPTII,S$GLB,, | Performed by: INTERNAL MEDICINE

## 2022-06-01 PROCEDURE — 1159F MED LIST DOCD IN RCRD: CPT | Mod: CPTII,S$GLB,, | Performed by: INTERNAL MEDICINE

## 2022-06-01 PROCEDURE — 1159F PR MEDICATION LIST DOCUMENTED IN MEDICAL RECORD: ICD-10-PCS | Mod: CPTII,S$GLB,, | Performed by: INTERNAL MEDICINE

## 2022-06-01 PROCEDURE — 99214 OFFICE O/P EST MOD 30 MIN: CPT | Mod: S$GLB,,, | Performed by: INTERNAL MEDICINE

## 2022-06-01 PROCEDURE — 3008F BODY MASS INDEX DOCD: CPT | Mod: CPTII,S$GLB,, | Performed by: INTERNAL MEDICINE

## 2022-06-01 PROCEDURE — 4010F ACE/ARB THERAPY RXD/TAKEN: CPT | Mod: CPTII,S$GLB,, | Performed by: INTERNAL MEDICINE

## 2022-06-01 PROCEDURE — 99999 PR PBB SHADOW E&M-EST. PATIENT-LVL IV: ICD-10-PCS | Mod: PBBFAC,,, | Performed by: INTERNAL MEDICINE

## 2022-06-01 PROCEDURE — 4010F PR ACE/ARB THEARPY RXD/TAKEN: ICD-10-PCS | Mod: CPTII,S$GLB,, | Performed by: INTERNAL MEDICINE

## 2022-06-01 PROCEDURE — 3079F PR MOST RECENT DIASTOLIC BLOOD PRESSURE 80-89 MM HG: ICD-10-PCS | Mod: CPTII,S$GLB,, | Performed by: INTERNAL MEDICINE

## 2022-06-01 PROCEDURE — 3079F DIAST BP 80-89 MM HG: CPT | Mod: CPTII,S$GLB,, | Performed by: INTERNAL MEDICINE

## 2022-06-01 PROCEDURE — 99999 PR PBB SHADOW E&M-EST. PATIENT-LVL IV: CPT | Mod: PBBFAC,,, | Performed by: INTERNAL MEDICINE

## 2022-06-01 PROCEDURE — 99214 PR OFFICE/OUTPT VISIT, EST, LEVL IV, 30-39 MIN: ICD-10-PCS | Mod: S$GLB,,, | Performed by: INTERNAL MEDICINE

## 2022-06-01 PROCEDURE — 1126F PR PAIN SEVERITY QUANTIFIED, NO PAIN PRESENT: ICD-10-PCS | Mod: CPTII,S$GLB,, | Performed by: INTERNAL MEDICINE

## 2022-06-01 PROCEDURE — 3077F PR MOST RECENT SYSTOLIC BLOOD PRESSURE >= 140 MM HG: ICD-10-PCS | Mod: CPTII,S$GLB,, | Performed by: INTERNAL MEDICINE

## 2022-06-01 NOTE — PROGRESS NOTES
Subjective:   @Patient ID:  Julia Rhoades is a 67 y.o. female who presents for evaluation of CAD, hx of V.fib arrest         HPI:   Here for initial visit with me  She has been doing well  No chest pain   She is active at yard with no issues  Compliant with medications, but she doesn't take zetia  She was taken off plavix last year by Dr. Park   BP slightly elevated. She stated it usually goes up during office visits. At home in 130s/140s. She doesn't monitor it closely though       [facility]  Diabetes mellitus type II, DM (diabetes mellitus), Heart attack (03/09/2020), HLD (hyperlipidemia), Hyperlipidemia, Hypertension, Obesity, and Peripheral neuropathy    Prior cardiovascular  Hx  --------------------------------    - Heart Catheterization  3/9/2020  · Two vessel coronary artery disease.  · Successful IVUS guided PCI to OM1 with OLINDA (3.0 x 15) with excellent result  · Successful IVUS guided PCI to mid LAD with OLINDA (3.0 x 26) with excellent result for abnormal iFR of 0.85 with post intervention iFR of 0.92  · Moderate distal LAD stenosis too small for intervention  · The ejection fraction is calculated to be 65%.  · Diastolic dysfunction.  · Procedure performed for NSTEMI and out of hospital cardiac arrest            - ECHO 5/7/2021  · Concentric hypertrophy and normal systolic function.  · The estimated ejection fraction is 60%.  · Normal left ventricular diastolic function.  · Mild left atrial enlargement.  · Normal right ventricular size with normal right ventricular systolic function.  · Normal central venous pressure (3 mmHg).  · The estimated PA systolic pressure is 21 mmHg.      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      Obesity 09/26/2013    Hypertension      HLD (hyperlipidemia)     Peripheral neuropathy     DM (diabetes mellitus)                     LAST HbA1c  Lab Results   Component Value Date    HGBA1C 8.8 (H) 03/08/2020       Lipid panel  Lab Results   Component Value Date    CHOL 230 (H) 11/16/2015    CHOL 242 (H) 07/21/2015    CHOL 241 (H) 03/13/2015     Lab Results   Component Value Date    HDL 46 11/16/2015    HDL 57 07/21/2015    HDL 65 03/13/2015     Lab Results   Component Value Date    LDLCALC 166.4 (H) 11/16/2015    LDLCALC 168.6 (H) 07/21/2015    LDLCALC 160.8 (H) 03/13/2015     Lab Results   Component Value Date    TRIG 88 11/16/2015    TRIG 82 07/21/2015    TRIG 76 03/13/2015     Lab Results   Component Value Date    CHOLHDL 20.0 11/16/2015    CHOLHDL 23.6 07/21/2015    CHOLHDL 27.0 03/13/2015            Review of Systems   Constitutional: Negative for chills and fever.   HENT: Negative for hearing loss and nosebleeds.    Eyes: Negative for blurred vision.   Cardiovascular: Negative for chest pain, leg swelling and palpitations.   Respiratory: Negative for hemoptysis and shortness of breath.    Hematologic/Lymphatic: Negative for bleeding problem.   Skin: Negative for itching.   Musculoskeletal: Negative for falls.   Gastrointestinal: Negative for abdominal pain and hematochezia.   Genitourinary: Negative for hematuria.   Neurological: Negative for dizziness and loss of balance.   Psychiatric/Behavioral: Negative for altered mental status and depression.       Objective:   Physical Exam  Constitutional:       Appearance: She is well-developed. She is obese.   HENT:      Head: Normocephalic and atraumatic.   Eyes:      Conjunctiva/sclera: Conjunctivae normal.   Neck:      Vascular: No carotid bruit or JVD.   Cardiovascular:      Rate and Rhythm: Normal rate and regular rhythm.      Pulses:           Carotid pulses are 2+ on the right side and 2+ on the left side.       Radial pulses are 2+ on the right side and 2+ on the left side.        Dorsalis  pedis pulses are 1+ on the right side and 1+ on the left side.      Heart sounds: Normal heart sounds. No murmur heard.    No friction rub. No gallop.   Pulmonary:      Effort: Pulmonary effort is normal. No respiratory distress.      Breath sounds: Normal breath sounds. No stridor. No wheezing.   Musculoskeletal:      Cervical back: Neck supple.   Skin:     General: Skin is warm and dry.   Neurological:      Mental Status: She is alert and oriented to person, place, and time.   Psychiatric:         Behavior: Behavior normal.         Assessment:     1. Mixed hyperlipidemia    2. PAD (peripheral artery disease)    3. Coronary artery disease involving native coronary artery of native heart without angina pectoris    4. Primary hypertension    5. Type 2 diabetes mellitus with other specified complication, with long-term current use of insulin    6. Class 1 obesity with serious comorbidity and body mass index (BMI) of 30.0 to 30.9 in adult, unspecified obesity type        Plan:     Stable CAD   Continue current medical tx  Obtain lipid panel. If LDL > 70 will need to increase Crestor dose. She is not taking the zetia   Monitor BP at home and keep log. Target <130/80. Low salt diet. Currently on Nifedipine 30, chlorthalidone 25, Toprol 50, Lisinopril 40 mg     I spent 5-10 minutes asking, assessing, assisting, arranging and advising heart healthy diet improvements. This included low-salt meals, portion control and health food alternatives. I also encourage 30 minutes of moderate exercise 3-4x a week.   Pertinent cardiac images and EKG reviewed independently.    Continue with current medical plan and lifestyle changes.  Return sooner for concerns or questions. If symptoms persist go to the ED  I have reviewed all pertinent data including patient's medical history in detail and updated the computerized patient record.     Orders Placed This Encounter   Procedures    Lipid Panel     fasting     Standing Status:   Future     " Standing Expiration Date:   6/1/2023       Follow up as scheduled.     She expressed verbal understanding and agreed with the plan    Patient's Medications   New Prescriptions    No medications on file   Previous Medications    ASPIRIN 81 MG CHEW    Take 1 tablet (81 mg total) by mouth once daily.    CALCIUM CARBONATE/VITAMIN D3 (CALTRATE 600 + D ORAL)    Take 1 tablet by mouth once daily.     CHLORTHALIDONE (HYGROTEN) 25 MG TAB    Take 25 mg by mouth once daily.    EZETIMIBE (ZETIA) 10 MG TABLET    Take 10 mg by mouth once daily.    INSULIN GLARGINE (LANTUS) 100 UNIT/ML INJECTION    Inject 14 Units into the skin 2 (two) times daily.    INSULIN SYRINGE-NEEDLE U-100 (BD INSULIN SYRINGE ULT-FINE II) 1 ML 31 X 5/16" SYRG    Inject 1 Syringe into the skin 5 (five) times daily.    LISINOPRIL (PRINIVIL,ZESTRIL) 40 MG TABLET    TAKE ONE TABLET BY MOUTH ONCE DAILY    MAGNESIUM CARBONATE ORAL    Take 200 mg by mouth.     METFORMIN (GLUCOPHAGE) 500 MG TABLET    Take 1 tablet (500 mg total) by mouth once daily.    METOPROLOL SUCCINATE (TOPROL-XL) 50 MG 24 HR TABLET    Take 1 tablet (50 mg total) by mouth once daily.    NIFEDIPINE (ADALAT CC) 30 MG TBSR    Take 1 tablet (30 mg total) by mouth once daily.    PEN NEEDLE 31 X 5/16 " NDLE        ROSUVASTATIN (CRESTOR) 10 MG TABLET    Take 1 tablet (10 mg total) by mouth every evening.   Modified Medications    No medications on file   Discontinued Medications    No medications on file        "

## 2022-06-06 ENCOUNTER — LAB VISIT (OUTPATIENT)
Dept: LAB | Facility: HOSPITAL | Age: 67
End: 2022-06-06
Attending: INTERNAL MEDICINE
Payer: MEDICARE

## 2022-06-06 DIAGNOSIS — E78.2 MIXED HYPERLIPIDEMIA: ICD-10-CM

## 2022-06-06 DIAGNOSIS — I73.9 PAD (PERIPHERAL ARTERY DISEASE): ICD-10-CM

## 2022-06-06 LAB
CHOLEST SERPL-MCNC: 172 MG/DL (ref 120–199)
CHOLEST/HDLC SERPL: 3.1 {RATIO} (ref 2–5)
HDLC SERPL-MCNC: 55 MG/DL (ref 40–75)
HDLC SERPL: 32 % (ref 20–50)
LDLC SERPL CALC-MCNC: 102.6 MG/DL (ref 63–159)
NONHDLC SERPL-MCNC: 117 MG/DL
TRIGL SERPL-MCNC: 72 MG/DL (ref 30–150)

## 2022-06-06 PROCEDURE — 36415 COLL VENOUS BLD VENIPUNCTURE: CPT | Performed by: INTERNAL MEDICINE

## 2022-06-06 PROCEDURE — 80061 LIPID PANEL: CPT | Performed by: INTERNAL MEDICINE

## 2022-06-06 NOTE — PROGRESS NOTES
Please ler her know I have reviewed the cholesterol results. The LDL ( bad cholesterol is above our target which is 70. I would like to increase the crestor to 20 mg qhs please. Thanks

## 2022-06-13 ENCOUNTER — TELEPHONE (OUTPATIENT)
Dept: CARDIOLOGY | Facility: CLINIC | Age: 67
End: 2022-06-13
Payer: MEDICARE

## 2022-06-13 RX ORDER — ROSUVASTATIN CALCIUM 20 MG/1
20 TABLET, COATED ORAL DAILY
COMMUNITY

## 2022-06-13 NOTE — TELEPHONE ENCOUNTER
Called patient gave the results told her to double up on the Crestor and when she is getting low to call so we can send new script for 20 mg. Patient verbalized understanding.

## 2022-06-13 NOTE — TELEPHONE ENCOUNTER
----- Message from Andrew Dillard MD sent at 6/6/2022  4:57 PM CDT -----  Please ler her know I have reviewed the cholesterol results. The LDL ( bad cholesterol is above our target which is 70. I would like to increase the crestor to 20 mg qhs please. Thanks

## 2022-09-09 PROCEDURE — 96374 THER/PROPH/DIAG INJ IV PUSH: CPT

## 2022-09-09 PROCEDURE — 99285 EMERGENCY DEPT VISIT HI MDM: CPT | Mod: 25

## 2022-09-09 PROCEDURE — 82962 GLUCOSE BLOOD TEST: CPT | Mod: 59

## 2022-09-09 PROCEDURE — 96375 TX/PRO/DX INJ NEW DRUG ADDON: CPT

## 2022-09-10 ENCOUNTER — HOSPITAL ENCOUNTER (EMERGENCY)
Facility: HOSPITAL | Age: 67
Discharge: HOME | End: 2022-09-10
Attending: STUDENT IN AN ORGANIZED HEALTH CARE EDUCATION/TRAINING PROGRAM
Payer: MEDICARE

## 2022-09-10 VITALS
DIASTOLIC BLOOD PRESSURE: 64 MMHG | SYSTOLIC BLOOD PRESSURE: 137 MMHG | BODY MASS INDEX: 31.66 KG/M2 | WEIGHT: 190.25 LBS | TEMPERATURE: 99 F | OXYGEN SATURATION: 98 % | RESPIRATION RATE: 17 BRPM | HEART RATE: 91 BPM

## 2022-09-10 DIAGNOSIS — E11.65 POORLY CONTROLLED DIABETES MELLITUS: ICD-10-CM

## 2022-09-10 DIAGNOSIS — R42 DIZZINESS: ICD-10-CM

## 2022-09-10 DIAGNOSIS — I10 HTN (HYPERTENSION): Primary | ICD-10-CM

## 2022-09-10 LAB
ALBUMIN SERPL BCP-MCNC: 3.5 G/DL (ref 3.5–5.2)
ALP SERPL-CCNC: 124 U/L (ref 55–135)
ALT SERPL W/O P-5'-P-CCNC: 70 U/L (ref 10–44)
ANION GAP SERPL CALC-SCNC: 11 MMOL/L (ref 8–16)
AST SERPL-CCNC: 53 U/L (ref 10–40)
BACTERIA #/AREA URNS HPF: NORMAL /HPF
BASOPHILS # BLD AUTO: 0.03 K/UL (ref 0–0.2)
BASOPHILS NFR BLD: 0.4 % (ref 0–1.9)
BILIRUB SERPL-MCNC: 0.4 MG/DL (ref 0.1–1)
BILIRUB UR QL STRIP: NEGATIVE
BNP SERPL-MCNC: 38 PG/ML (ref 0–99)
BUN SERPL-MCNC: 18 MG/DL (ref 8–23)
CALCIUM SERPL-MCNC: 9.2 MG/DL (ref 8.7–10.5)
CHLORIDE SERPL-SCNC: 100 MMOL/L (ref 95–110)
CLARITY UR: CLEAR
CO2 SERPL-SCNC: 25 MMOL/L (ref 23–29)
COLOR UR: YELLOW
CREAT SERPL-MCNC: 1.2 MG/DL (ref 0.5–1.4)
DIFFERENTIAL METHOD: ABNORMAL
EOSINOPHIL # BLD AUTO: 0.1 K/UL (ref 0–0.5)
EOSINOPHIL NFR BLD: 0.9 % (ref 0–8)
ERYTHROCYTE [DISTWIDTH] IN BLOOD BY AUTOMATED COUNT: 13.6 % (ref 11.5–14.5)
EST. GFR  (NO RACE VARIABLE): 50 ML/MIN/1.73 M^2
GLUCOSE SERPL-MCNC: 401 MG/DL (ref 70–110)
GLUCOSE UR QL STRIP: ABNORMAL
HCT VFR BLD AUTO: 41.6 % (ref 37–48.5)
HGB BLD-MCNC: 13.3 G/DL (ref 12–16)
HGB UR QL STRIP: ABNORMAL
IMM GRANULOCYTES # BLD AUTO: 0.02 K/UL (ref 0–0.04)
IMM GRANULOCYTES NFR BLD AUTO: 0.3 % (ref 0–0.5)
KETONES UR QL STRIP: ABNORMAL
LEUKOCYTE ESTERASE UR QL STRIP: ABNORMAL
LYMPHOCYTES # BLD AUTO: 2.2 K/UL (ref 1–4.8)
LYMPHOCYTES NFR BLD: 28.9 % (ref 18–48)
MCH RBC QN AUTO: 25.9 PG (ref 27–31)
MCHC RBC AUTO-ENTMCNC: 32 G/DL (ref 32–36)
MCV RBC AUTO: 81 FL (ref 82–98)
MICROSCOPIC COMMENT: NORMAL
MONOCYTES # BLD AUTO: 0.6 K/UL (ref 0.3–1)
MONOCYTES NFR BLD: 8.3 % (ref 4–15)
NEUTROPHILS # BLD AUTO: 4.7 K/UL (ref 1.8–7.7)
NEUTROPHILS NFR BLD: 61.2 % (ref 38–73)
NITRITE UR QL STRIP: NEGATIVE
NRBC BLD-RTO: 0 /100 WBC
PH UR STRIP: 6 [PH] (ref 5–8)
PLATELET # BLD AUTO: 165 K/UL (ref 150–450)
PMV BLD AUTO: 12.9 FL (ref 9.2–12.9)
POCT GLUCOSE: 194 MG/DL (ref 70–110)
POCT GLUCOSE: 326 MG/DL (ref 70–110)
POCT GLUCOSE: 329 MG/DL (ref 70–110)
POTASSIUM SERPL-SCNC: 4.2 MMOL/L (ref 3.5–5.1)
PROT SERPL-MCNC: 7.4 G/DL (ref 6–8.4)
PROT UR QL STRIP: NEGATIVE
RBC # BLD AUTO: 5.14 M/UL (ref 4–5.4)
RBC #/AREA URNS HPF: 0 /HPF (ref 0–4)
SODIUM SERPL-SCNC: 136 MMOL/L (ref 136–145)
SP GR UR STRIP: 1.02 (ref 1–1.03)
TROPONIN I SERPL DL<=0.01 NG/ML-MCNC: 0.02 NG/ML (ref 0–0.03)
TROPONIN I SERPL DL<=0.01 NG/ML-MCNC: <0.006 NG/ML (ref 0–0.03)
URN SPEC COLLECT METH UR: ABNORMAL
UROBILINOGEN UR STRIP-ACNC: NEGATIVE EU/DL
WBC # BLD AUTO: 7.67 K/UL (ref 3.9–12.7)
WBC #/AREA URNS HPF: 5 /HPF (ref 0–5)
YEAST URNS QL MICRO: NORMAL

## 2022-09-10 PROCEDURE — 93010 EKG 12-LEAD: ICD-10-PCS | Mod: ,,, | Performed by: INTERNAL MEDICINE

## 2022-09-10 PROCEDURE — 25500020 PHARM REV CODE 255: Performed by: STUDENT IN AN ORGANIZED HEALTH CARE EDUCATION/TRAINING PROGRAM

## 2022-09-10 PROCEDURE — 93010 ELECTROCARDIOGRAM REPORT: CPT | Mod: ,,, | Performed by: INTERNAL MEDICINE

## 2022-09-10 PROCEDURE — 84484 ASSAY OF TROPONIN QUANT: CPT | Mod: 91 | Performed by: STUDENT IN AN ORGANIZED HEALTH CARE EDUCATION/TRAINING PROGRAM

## 2022-09-10 PROCEDURE — 83880 ASSAY OF NATRIURETIC PEPTIDE: CPT | Performed by: EMERGENCY MEDICINE

## 2022-09-10 PROCEDURE — 80053 COMPREHEN METABOLIC PANEL: CPT | Performed by: EMERGENCY MEDICINE

## 2022-09-10 PROCEDURE — 81000 URINALYSIS NONAUTO W/SCOPE: CPT | Performed by: STUDENT IN AN ORGANIZED HEALTH CARE EDUCATION/TRAINING PROGRAM

## 2022-09-10 PROCEDURE — 93005 ELECTROCARDIOGRAM TRACING: CPT

## 2022-09-10 PROCEDURE — 25000003 PHARM REV CODE 250: Performed by: EMERGENCY MEDICINE

## 2022-09-10 PROCEDURE — 63600175 PHARM REV CODE 636 W HCPCS: Performed by: EMERGENCY MEDICINE

## 2022-09-10 PROCEDURE — 84484 ASSAY OF TROPONIN QUANT: CPT | Performed by: EMERGENCY MEDICINE

## 2022-09-10 PROCEDURE — 85025 COMPLETE CBC W/AUTO DIFF WBC: CPT | Performed by: STUDENT IN AN ORGANIZED HEALTH CARE EDUCATION/TRAINING PROGRAM

## 2022-09-10 RX ORDER — LABETALOL HYDROCHLORIDE 5 MG/ML
10 INJECTION, SOLUTION INTRAVENOUS
Status: COMPLETED | OUTPATIENT
Start: 2022-09-10 | End: 2022-09-10

## 2022-09-10 RX ORDER — SPIRONOLACTONE 25 MG/1
25 TABLET ORAL
Status: COMPLETED | OUTPATIENT
Start: 2022-09-10 | End: 2022-09-10

## 2022-09-10 RX ORDER — MECLIZINE HYDROCHLORIDE 25 MG/1
25 TABLET ORAL
Status: COMPLETED | OUTPATIENT
Start: 2022-09-10 | End: 2022-09-10

## 2022-09-10 RX ADMIN — SPIRONOLACTONE 25 MG: 25 TABLET ORAL at 11:09

## 2022-09-10 RX ADMIN — MECLIZINE HYDROCHLORIDE 25 MG: 25 TABLET ORAL at 10:09

## 2022-09-10 RX ADMIN — IOHEXOL 100 ML: 350 INJECTION, SOLUTION INTRAVENOUS at 08:09

## 2022-09-10 RX ADMIN — INSULIN HUMAN 4 UNITS: 100 INJECTION, SOLUTION PARENTERAL at 09:09

## 2022-09-10 RX ADMIN — LABETALOL HYDROCHLORIDE 10 MG: 5 INJECTION INTRAVENOUS at 11:09

## 2022-09-10 NOTE — ED PROVIDER NOTES
Encounter Date: 9/9/2022       History     Chief Complaint   Patient presents with    Dizziness     Patient reports intermittent dizziness x 1 week. This evening became nauseated after going from sitting to standing position. States room was spinning. Denies history of vertigo. States she has been having a minor cough with nasal congestion. Denies sick contacts or fever. Denies headache or change in vision. Neuro assessment negative.      67-year-old female with history of DM, hyperlipidemia, hypertension and prior MI presents with intermittent dizziness that has worsened for the past week.  Says today she felt like the room was spinning and was not able to ambulate.  She reports symptoms worsen when standing from a sitting position.  She denies any headache or changes in vision.  Denies any recent fevers or chills.  Does report having mild congestion and a cough.  Denies any recent sick contacts.  Denies any numbness tingling or weakness.  She reports taking all her medication as prescribed and not missing any doses.  Denies any chest pain.  Denies any says.  Denies any shortness of breath.  Denies any nausea or vomiting.      Review of patient's allergies indicates:   Allergen Reactions    Januvia [sitagliptin]     Lipitor [atorvastatin]      Shoulder pain    Norvasc [amlodipine]      Weak, dizzy    Oxycodone-acetaminophen     Hydralazine analogues Anxiety     Past Medical History:   Diagnosis Date    Diabetes mellitus type II     DM (diabetes mellitus)     Heart attack 03/09/2020    HLD (hyperlipidemia)     Hyperlipidemia     Hypertension     Obesity     Peripheral neuropathy      Past Surgical History:   Procedure Laterality Date    ANGIOGRAM, CORONARY, WITH LEFT HEART CATHETERIZATION  3/9/2020    Procedure: Angiogram, Coronary, with Left Heart Cath;  Surgeon: Magnus Baird MD;  Location: [facility] CATH LAB/EP;  Service: Cardiology;;    BACK SURGERY      HYSTERECTOMY       Family History   Problem Relation Age of  Onset    Diabetes Mother     Diabetes Sister     Diabetes Brother     Diabetes Sister     Diabetes Sister     Diabetes Sister     Diabetes Sister     Hypertension Unknown     Coronary artery disease Neg Hx     Cancer Neg Hx      Social History     Tobacco Use    Smoking status: Never    Smokeless tobacco: Never   Substance Use Topics    Alcohol use: No    Drug use: No     Review of Systems   Constitutional:  Negative for chills and fever.   HENT:  Negative for congestion and rhinorrhea.    Eyes:  Negative for pain.   Respiratory:  Negative for cough and shortness of breath.    Cardiovascular:  Negative for chest pain and leg swelling.   Gastrointestinal:  Negative for abdominal pain, nausea and vomiting.   Endocrine: Negative for polyuria.   Genitourinary:  Negative for dysuria and hematuria.   Musculoskeletal:  Negative for gait problem and neck pain.   Skin:  Negative for rash.   Neurological:  Positive for dizziness. Negative for weakness and headaches.     Physical Exam     Initial Vitals [09/10/22 0008]   BP Pulse Resp Temp SpO2   (!) 194/84 71 17 98.5 °F (36.9 °C) 99 %      MAP       --         Physical Exam    Constitutional: She appears well-developed and well-nourished. She is not diaphoretic. No distress.   HENT:   Head: Normocephalic and atraumatic.   Right Ear: External ear normal.   Left Ear: External ear normal.   Eyes: Conjunctivae and EOM are normal. Pupils are equal, round, and reactive to light.   Neck: Neck supple. No JVD present.   Normal range of motion.  Cardiovascular:  Normal rate, regular rhythm and normal heart sounds.           No murmur heard.  Pulmonary/Chest: Breath sounds normal. No respiratory distress.   Abdominal: Abdomen is soft. Bowel sounds are normal.   Musculoskeletal:      Cervical back: Normal range of motion and neck supple.     Neurological: She is alert and oriented to person, place, and time. She has normal strength. No cranial nerve deficit.   Moves all extremities and  carries on conversation. CN- II: PERRL; III/IV/VI: EOMI w/out evidence of nystagmus; V: no deficits appreciated to light touch bilateral face; VII: no facial weakness, no facial asymmetry. Eyebrow raise symmetric. Smile symmetric; IX/X: palate midline, and raises symmetrically; XI: shoulder shrug 5/5 bilaterally; XII: tongue is midline w/out asymmetry. Strength 5/5 to bilateral upper and lower extremities, sensation intact to light touch,   Skin: Skin is warm. Capillary refill takes less than 2 seconds.       ED Course   Procedures  Labs Reviewed   URINALYSIS, REFLEX TO URINE CULTURE - Abnormal; Notable for the following components:       Result Value    Glucose, UA 4+ (*)     Ketones, UA 1+ (*)     Occult Blood UA Trace (*)     Leukocytes, UA 1+ (*)     All other components within normal limits    Narrative:     Specimen Source->Urine   POCT GLUCOSE - Abnormal; Notable for the following components:    POCT Glucose 194 (*)     All other components within normal limits   TROPONIN I   URINALYSIS MICROSCOPIC    Narrative:     Specimen Source->Urine   COMPREHENSIVE METABOLIC PANEL   B-TYPE NATRIURETIC PEPTIDE   TROPONIN I   CBC W/ AUTO DIFFERENTIAL          Imaging Results              X-Ray Chest AP Portable (Final result)  Result time 09/10/22 05:35:22      Final result by Charlotte Wood MD (09/10/22 05:35:22)                   Impression:      No acute intrathoracic abnormality identified on this single radiographic view of the chest.      Electronically signed by: Charlotte Wood MD  Date:    09/10/2022  Time:    05:35               Narrative:    EXAMINATION:  XR CHEST AP PORTABLE    CLINICAL HISTORY:  cough;    TECHNIQUE:  Single frontal view of the chest was performed.    COMPARISON:  03/08/2020    FINDINGS:  The cardiomediastinal silhouette is within normal limits.  Mediastinal structures are midline.  The lungs are symmetrically expanded with no evidence of confluent airspace consolidation.  No large volume of  pleural fluid or pneumothorax is appreciated.  Osseous structures are intact.                                       Medications - No data to display  Medical Decision Making:   Initial Assessment:   67-year-old female history of hypertension, hyperlipidemia, diabetes and prior MI presenting with dizziness for the past week though acutely worsened yesterday.  Patient in no acute distress exam with no focal neurological deficits.  Vitals notable for elevated blood pressure but otherwise unremarkable.  Given patient's age and risk factors concern for possible subacute stroke so will obtain CTA of the head and neck.  Low suspicion for infectious etiology as the cause of her vertigo.  Orthostatic blood pressures within normal limits.  Low suspicion for arrhythmias as a cause.  Although patient's blood pressure is elevated low suspicion for hypertensive emergency but will assess lab to see if there is any end-organ damage.  Low suspicion for ACS at this time.  Will obtain labs and CT imaging and reassess.  If CT negative will treat with meclizine and have patient follow-up with primary care doctor.                    6:05 AM  Patient care signed out to oncoming doctor.  Patient currently awaiting CTA of the head and neck and labs.  Blood pressure improved from systolics of 218 to 172 without intervention.  If CT imaging negative patient be given p.o. home antihypertensives and meclizine with close primary care follow-up. I discussed with the patient/family the diagnosis, treatment plan, indications for return to the emergency department, and for expected follow-up. The patient/family verbalized an understanding. The patient/family is asked if there are any questions or concerns. We discuss the case, until all issues are addressed to the patient/family's satisfaction. Patient/family understands and is agreeable to the plan.      Clinical Impression:   Final diagnoses:  [I10] HTN (hypertension) (Primary)  [R42] Dizziness        This dictation has been generated using M-Modal Fluency Direct dictation; some phonetic errors may occur.             Lyly Mitchell MD  09/10/22 0788

## 2022-09-10 NOTE — DISCHARGE INSTRUCTIONS
Thank you for coming to our Emergency Department today. It is important to remember that some problems are difficult to diagnose and may not be found during your first visit. Be sure to follow up with your primary care doctor and review any labs/imaging that was performed with them. If you do not have a primary care doctor, you may contact the one listed on your discharge paperwork or you may also call the [facility] Appointment Desk at [phone] to schedule an appointment with one.     All medications may potentially have side effects and it is impossible to predict which medications may give you side effects. If you feel that you are having a negative effect of any medication you should immediately stop taking them and seek medical attention.    Return to the ER with any questions/concerns, new/concerning symptoms, worsening or failure to improve. Do not drive or make any important decisions for 24 hours if you have received any pain medications, sedatives or mood altering drugs during your ER visit.

## 2022-09-10 NOTE — ED NOTES
Pt c/o dizziness and nausea x 1 day. Pt hypertensive, reports compliance w/ BP medication. Denies pain. Pt AAOx3. Respirations even and unlabored. Skin is warm and dry. NAD noted. Pt son at bedside. CB within reach.

## 2022-09-10 NOTE — ED PROVIDER NOTES
Care of patient accepted from Dr. Mitchell at shift change awaiting head CTA results.  CTA without acute intracranial findings.  Patient was noted with elevated pressures while in the ED which was treated.  Systolic gradually reduced into the 130-140 range.  Patient reported resolution of her symptoms.  She will be discharged in stable condition to continue current medication regimen and follow up with the primary physician as soon as able.  Most importantly, patient may return to the ED for any recurrence of symptoms or any other urgent concerns.     Lan Granado MD  09/10/22 8807

## 2022-10-21 ENCOUNTER — HOSPITAL ENCOUNTER (EMERGENCY)
Facility: HOSPITAL | Age: 67
Discharge: HOME | End: 2022-10-21
Attending: EMERGENCY MEDICINE
Payer: MEDICARE

## 2022-10-21 VITALS
RESPIRATION RATE: 20 BRPM | WEIGHT: 191 LBS | OXYGEN SATURATION: 98 % | TEMPERATURE: 98 F | BODY MASS INDEX: 30.7 KG/M2 | HEIGHT: 66 IN | HEART RATE: 57 BPM | SYSTOLIC BLOOD PRESSURE: 174 MMHG | DIASTOLIC BLOOD PRESSURE: 76 MMHG

## 2022-10-21 DIAGNOSIS — E16.0 HYPOGLYCEMIA DUE TO INSULIN: Primary | ICD-10-CM

## 2022-10-21 DIAGNOSIS — T38.3X5A HYPOGLYCEMIA DUE TO INSULIN: Primary | ICD-10-CM

## 2022-10-21 LAB
ALBUMIN SERPL BCP-MCNC: 3.2 G/DL (ref 3.5–5.2)
ALP SERPL-CCNC: 104 U/L (ref 55–135)
ALT SERPL W/O P-5'-P-CCNC: 40 U/L (ref 10–44)
ANION GAP SERPL CALC-SCNC: 12 MMOL/L (ref 8–16)
AST SERPL-CCNC: 36 U/L (ref 10–40)
BASOPHILS # BLD AUTO: 0.05 K/UL (ref 0–0.2)
BASOPHILS NFR BLD: 0.8 % (ref 0–1.9)
BILIRUB SERPL-MCNC: 0.3 MG/DL (ref 0.1–1)
BUN SERPL-MCNC: 8 MG/DL (ref 8–23)
CALCIUM SERPL-MCNC: 8.6 MG/DL (ref 8.7–10.5)
CHLORIDE SERPL-SCNC: 103 MMOL/L (ref 95–110)
CO2 SERPL-SCNC: 23 MMOL/L (ref 23–29)
CREAT SERPL-MCNC: 0.9 MG/DL (ref 0.5–1.4)
DIFFERENTIAL METHOD: ABNORMAL
EOSINOPHIL # BLD AUTO: 0.1 K/UL (ref 0–0.5)
EOSINOPHIL NFR BLD: 1.5 % (ref 0–8)
ERYTHROCYTE [DISTWIDTH] IN BLOOD BY AUTOMATED COUNT: 14 % (ref 11.5–14.5)
EST. GFR  (NO RACE VARIABLE): >60 ML/MIN/1.73 M^2
GLUCOSE SERPL-MCNC: 216 MG/DL (ref 70–110)
HCT VFR BLD AUTO: 38.6 % (ref 37–48.5)
HGB BLD-MCNC: 12.4 G/DL (ref 12–16)
IMM GRANULOCYTES # BLD AUTO: 0.04 K/UL (ref 0–0.04)
IMM GRANULOCYTES NFR BLD AUTO: 0.6 % (ref 0–0.5)
LYMPHOCYTES # BLD AUTO: 1.6 K/UL (ref 1–4.8)
LYMPHOCYTES NFR BLD: 24 % (ref 18–48)
MCH RBC QN AUTO: 26.2 PG (ref 27–31)
MCHC RBC AUTO-ENTMCNC: 32.1 G/DL (ref 32–36)
MCV RBC AUTO: 81 FL (ref 82–98)
MONOCYTES # BLD AUTO: 0.6 K/UL (ref 0.3–1)
MONOCYTES NFR BLD: 9.2 % (ref 4–15)
NEUTROPHILS # BLD AUTO: 4.2 K/UL (ref 1.8–7.7)
NEUTROPHILS NFR BLD: 63.9 % (ref 38–73)
NRBC BLD-RTO: 0 /100 WBC
PLATELET # BLD AUTO: 192 K/UL (ref 150–450)
PMV BLD AUTO: 12.3 FL (ref 9.2–12.9)
POCT GLUCOSE: 197 MG/DL (ref 70–110)
POCT GLUCOSE: 314 MG/DL (ref 70–110)
POCT GLUCOSE: 388 MG/DL (ref 70–110)
POTASSIUM SERPL-SCNC: 3.1 MMOL/L (ref 3.5–5.1)
PROT SERPL-MCNC: 7 G/DL (ref 6–8.4)
RBC # BLD AUTO: 4.74 M/UL (ref 4–5.4)
SODIUM SERPL-SCNC: 138 MMOL/L (ref 136–145)
WBC # BLD AUTO: 6.5 K/UL (ref 3.9–12.7)

## 2022-10-21 PROCEDURE — 82962 GLUCOSE BLOOD TEST: CPT

## 2022-10-21 PROCEDURE — 99283 EMERGENCY DEPT VISIT LOW MDM: CPT | Mod: 25

## 2022-10-21 PROCEDURE — 80053 COMPREHEN METABOLIC PANEL: CPT | Performed by: EMERGENCY MEDICINE

## 2022-10-21 PROCEDURE — 85025 COMPLETE CBC W/AUTO DIFF WBC: CPT | Performed by: EMERGENCY MEDICINE

## 2022-10-21 NOTE — ED PROVIDER NOTES
Encounter Date: 10/21/2022       History     Chief Complaint   Patient presents with    Hypoglycemia     Pt arrives via EMS with complaints of low blood sugar. Pt  called EMS due to pt being less responsive and sweating in bed. Pt has no current complaints in triage and denies any pain.      HPI    Pleasant 67-year-old female history diabetes presents the ER for evaluation of hypoglycemia.  Onset today.  Patient reports prior to bed she noticed her blood sugar was 266, she took extra insulin.  Patient's  noted later that night, she was diaphoretic with decreased responsiveness.  EMS was activated, noted her blood sugar was low 60, given D50 improved to 300 with improvement in mentation.  Patient arrived in the ER no acute distress no complaints at this time.      Review of patient's allergies indicates:   Allergen Reactions    Januvia [sitagliptin]     Lipitor [atorvastatin]      Shoulder pain    Norvasc [amlodipine]      Weak, dizzy    Oxycodone-acetaminophen     Hydralazine analogues Anxiety     Past Medical History:   Diagnosis Date    Diabetes mellitus type II     DM (diabetes mellitus)     Heart attack 03/09/2020    HLD (hyperlipidemia)     Hyperlipidemia     Hypertension     Obesity     Peripheral neuropathy      Past Surgical History:   Procedure Laterality Date    ANGIOGRAM, CORONARY, WITH LEFT HEART CATHETERIZATION  3/9/2020    Procedure: Angiogram, Coronary, with Left Heart Cath;  Surgeon: Magnus Baird MD;  Location: [facility] CATH LAB/EP;  Service: Cardiology;;    BACK SURGERY      HYSTERECTOMY       Family History   Problem Relation Age of Onset    Diabetes Mother     Diabetes Sister     Diabetes Brother     Diabetes Sister     Diabetes Sister     Diabetes Sister     Diabetes Sister     Hypertension Unknown     Coronary artery disease Neg Hx     Cancer Neg Hx      Social History     Tobacco Use    Smoking status: Never    Smokeless tobacco: Never   Substance Use Topics    Alcohol use: No     Drug use: No     Review of Systems   Constitutional:  Positive for diaphoresis. Negative for fever.   Respiratory:  Negative for shortness of breath.    Cardiovascular:  Negative for chest pain.   All other systems reviewed and are negative.    Physical Exam     Initial Vitals [10/21/22 0414]   BP Pulse Resp Temp SpO2   (!) 186/124 72 18 98.3 °F (36.8 °C) 100 %      MAP       --         Physical Exam    Nursing note and vitals reviewed.  Constitutional: She appears well-developed and well-nourished. No distress.   HENT:   Head: Normocephalic and atraumatic.   Eyes: Pupils are equal, round, and reactive to light.   Neck:   Normal range of motion.  Cardiovascular:  Normal rate, regular rhythm and normal heart sounds.           Pulmonary/Chest: Breath sounds normal. No respiratory distress.   Abdominal: Abdomen is soft. She exhibits no distension. There is no abdominal tenderness.   Musculoskeletal:         General: Normal range of motion.      Cervical back: Normal range of motion.     Neurological: She is alert and oriented to person, place, and time. She has normal strength. GCS score is 15. GCS eye subscore is 4. GCS verbal subscore is 5. GCS motor subscore is 6.   Skin: Skin is warm and dry. Capillary refill takes less than 2 seconds.   Psychiatric: She has a normal mood and affect. Thought content normal.       ED Course   Procedures  Labs Reviewed   CBC W/ AUTO DIFFERENTIAL - Abnormal; Notable for the following components:       Result Value    MCV 81 (*)     MCH 26.2 (*)     Immature Granulocytes 0.6 (*)     All other components within normal limits   COMPREHENSIVE METABOLIC PANEL - Abnormal; Notable for the following components:    Potassium 3.1 (*)     Glucose 216 (*)     Calcium 8.6 (*)     Albumin 3.2 (*)     All other components within normal limits   POCT GLUCOSE - Abnormal; Notable for the following components:    POCT Glucose 197 (*)     All other components within normal limits   POCT GLUCOSE -  Abnormal; Notable for the following components:    POCT Glucose 314 (*)     All other components within normal limits   POCT GLUCOSE - Abnormal; Notable for the following components:    POCT Glucose 388 (*)     All other components within normal limits          Imaging Results    None          Medications - No data to display  Medical Decision Making:   Initial Assessment:   67-year-old female presents the ER for evaluation of hypoglycemia.  She reports her blood sugar was elevated and she took extra insulin prior to going to bed.  She was altered with a low blood sugar improved with D50.  She has no complaints well-appearing neurologically intact.  Will plan basic blood work observation feet patient recheck blood sugar reassess hopefully discharge blood sugar remained stable.           ED Course as of 10/21/22 2005   Fri Oct 21, 2022   0551 Resting in bed no acute distress, will sign out to 6:00 a.m. physician for reassessment and dispo. [SE]      ED Course User Index  [SE] Casandra Gamez MD                 Clinical Impression:   Final diagnoses:  [E16.0, T38.3X5A] Hypoglycemia due to insulin (Primary)      ED Disposition Condition    Discharge Stable          ED Prescriptions    None       Follow-up Information       Follow up With Specialties Details Why Contact Info    De Pimentel MD Family Medicine Schedule an appointment as soon as possible for a visit   [address] [facility] [id number]  [phone]               Casandra Gamez MD  10/21/22 2005

## 2022-10-21 NOTE — PROVIDER PROGRESS NOTES - EMERGENCY DEPT.
Encounter Date: 10/21/2022    ED Physician Progress Notes          Patient turned over to me by Dr. Gamez at 6:00 a.m., pending re-evaluation of glucose.    Subsequent glucose continue to rise, though slightly.    Discussed with patient regarding management in the ED with insulin verses monitoring sugar at home and taking morning dose of insulin.  Patient opting to go home and manage herself, feels comfortable taking insulin, checking sugar is food available if becoming hypoglycemic.  Otherwise patient has no complaints and is stable for discharge.  Follow-up with PCP as needed or return to the ED for any emergent concerns.

## 2022-10-21 NOTE — ED NOTES
67 y.o. female to ED for evaluation of hypoglycemia. Per EMS patient was found unresponsive and diaphoretic by her  who activated EMS. Upon EMS arrival patient CBG was 68. Patient was given 25mg of D50. Patient states she felt a little dizzy after she woke up but denies all medical complaints currently. Patient awake, alert, and oriented x 4. No apparent distress noted. VS currently stable. Patient assisted onto stretcher and changed into a gown. Patient placed on cardiac monitor, continuous pulse oximetry and automatic blood pressure cuff. Bed placed in low locked position, side rails up x 2, call light is within reach of patient orientation to room and explanation of wait provided to patient, alarms set and turned on for monitor and pulse ox, awaiting MD evaluation and orders, will continue to monitor.

## 2023-03-01 ENCOUNTER — OFFICE VISIT (OUTPATIENT)
Dept: OBSTETRICS AND GYNECOLOGY | Facility: CLINIC | Age: 68
End: 2023-03-01
Payer: MEDICARE

## 2023-03-01 VITALS
WEIGHT: 189.81 LBS | SYSTOLIC BLOOD PRESSURE: 170 MMHG | DIASTOLIC BLOOD PRESSURE: 82 MMHG | BODY MASS INDEX: 30.51 KG/M2 | HEIGHT: 66 IN

## 2023-03-01 DIAGNOSIS — Z01.419 ROUTINE GYNECOLOGICAL EXAMINATION: Primary | ICD-10-CM

## 2023-03-01 PROCEDURE — 1101F PT FALLS ASSESS-DOCD LE1/YR: CPT | Mod: CPTII,S$GLB,, | Performed by: OBSTETRICS & GYNECOLOGY

## 2023-03-01 PROCEDURE — 99999 PR PBB SHADOW E&M-EST. PATIENT-LVL III: ICD-10-PCS | Mod: PBBFAC,,, | Performed by: OBSTETRICS & GYNECOLOGY

## 2023-03-01 PROCEDURE — 4010F PR ACE/ARB THEARPY RXD/TAKEN: ICD-10-PCS | Mod: CPTII,S$GLB,, | Performed by: OBSTETRICS & GYNECOLOGY

## 2023-03-01 PROCEDURE — 3288F FALL RISK ASSESSMENT DOCD: CPT | Mod: CPTII,S$GLB,, | Performed by: OBSTETRICS & GYNECOLOGY

## 2023-03-01 PROCEDURE — 1126F AMNT PAIN NOTED NONE PRSNT: CPT | Mod: CPTII,S$GLB,, | Performed by: OBSTETRICS & GYNECOLOGY

## 2023-03-01 PROCEDURE — 3079F PR MOST RECENT DIASTOLIC BLOOD PRESSURE 80-89 MM HG: ICD-10-PCS | Mod: CPTII,S$GLB,, | Performed by: OBSTETRICS & GYNECOLOGY

## 2023-03-01 PROCEDURE — 3077F SYST BP >= 140 MM HG: CPT | Mod: CPTII,S$GLB,, | Performed by: OBSTETRICS & GYNECOLOGY

## 2023-03-01 PROCEDURE — 3079F DIAST BP 80-89 MM HG: CPT | Mod: CPTII,S$GLB,, | Performed by: OBSTETRICS & GYNECOLOGY

## 2023-03-01 PROCEDURE — 99999 PR PBB SHADOW E&M-EST. PATIENT-LVL III: CPT | Mod: PBBFAC,,, | Performed by: OBSTETRICS & GYNECOLOGY

## 2023-03-01 PROCEDURE — 3288F PR FALLS RISK ASSESSMENT DOCUMENTED: ICD-10-PCS | Mod: CPTII,S$GLB,, | Performed by: OBSTETRICS & GYNECOLOGY

## 2023-03-01 PROCEDURE — 3008F BODY MASS INDEX DOCD: CPT | Mod: CPTII,S$GLB,, | Performed by: OBSTETRICS & GYNECOLOGY

## 2023-03-01 PROCEDURE — 3077F PR MOST RECENT SYSTOLIC BLOOD PRESSURE >= 140 MM HG: ICD-10-PCS | Mod: CPTII,S$GLB,, | Performed by: OBSTETRICS & GYNECOLOGY

## 2023-03-01 PROCEDURE — 1159F MED LIST DOCD IN RCRD: CPT | Mod: CPTII,S$GLB,, | Performed by: OBSTETRICS & GYNECOLOGY

## 2023-03-01 PROCEDURE — 4010F ACE/ARB THERAPY RXD/TAKEN: CPT | Mod: CPTII,S$GLB,, | Performed by: OBSTETRICS & GYNECOLOGY

## 2023-03-01 PROCEDURE — 1126F PR PAIN SEVERITY QUANTIFIED, NO PAIN PRESENT: ICD-10-PCS | Mod: CPTII,S$GLB,, | Performed by: OBSTETRICS & GYNECOLOGY

## 2023-03-01 PROCEDURE — G0101 CA SCREEN;PELVIC/BREAST EXAM: HCPCS | Mod: GZ,S$GLB,, | Performed by: OBSTETRICS & GYNECOLOGY

## 2023-03-01 PROCEDURE — G0101 PR CA SCREEN;PELVIC/BREAST EXAM: ICD-10-PCS | Mod: GZ,S$GLB,, | Performed by: OBSTETRICS & GYNECOLOGY

## 2023-03-01 PROCEDURE — 3008F PR BODY MASS INDEX (BMI) DOCUMENTED: ICD-10-PCS | Mod: CPTII,S$GLB,, | Performed by: OBSTETRICS & GYNECOLOGY

## 2023-03-01 PROCEDURE — 1101F PR PT FALLS ASSESS DOC 0-1 FALLS W/OUT INJ PAST YR: ICD-10-PCS | Mod: CPTII,S$GLB,, | Performed by: OBSTETRICS & GYNECOLOGY

## 2023-03-01 PROCEDURE — 1159F PR MEDICATION LIST DOCUMENTED IN MEDICAL RECORD: ICD-10-PCS | Mod: CPTII,S$GLB,, | Performed by: OBSTETRICS & GYNECOLOGY

## 2023-03-01 RX ORDER — ASPIRIN 81 MG/1
TABLET ORAL
COMMUNITY

## 2023-03-01 NOTE — PROGRESS NOTES
"  68 yo female s/p HYST in 1992 for uterine fibroids who presents for routine gyn visit.  Reports that her ovaries are still intact.  . Declines STD testing.  PCP is Dr. Pimentel who ordered her mammogram. Gets it at [facility].  Per patient was wnl.  Reports that colonoscopy is also uptodate  The patient has no other gyn complaints today.     ROS:  GENERAL: Denies weight gain or weight loss. Feeling well overall.   SKIN: Denies rash or lesions. vitiligo  HEAD: Denies head injury or headache.   CHEST: Denies chest pain or shortness of breath.   CARDIOVASCULAR: Denies palpitations or left sided chest pain.   ABDOMEN: No abdominal pain, constipation, diarrhea, nausea, vomiting or rectal bleeding.   URINARY: No frequency, dysuria, hematuria, or burning on urination.  REPRODUCTIVE: See HPI.   BREASTS:  denies pain, lumps, or nipple discharge.   HEMATOLOGIC: No easy bruisability or excessive bleeding.   MUSCULOSKELETAL: Denies joint pain or swelling.   NEUROLOGIC: Denies syncope or weakness.   PSYCHIATRIC: Denies depression, anxiety or mood swings.         PE:   Vitals: BP (!) 170/82   Ht 5' 6" (1.676 m)   Wt 86.1 kg (189 lb 13.1 oz)   LMP  (LMP Unknown)   BMI 30.64 kg/m²   APPEARANCE: Well nourished, well developed, in no acute distress.  SKIN: Normal skin turgor, no lesions.  ABDOMEN: Soft. No tenderness or masses. No hepatosplenomegaly. No hernias.  BREASTS: Symmetrical, no skin changes or visible lesions. No palpable masses, nipple discharge or adenopathy bilaterally.  PELVIC: Normal external female genitalia without lesions. Light colored areas noted on bilateral vulvar. Normal hair distribution. Adequate perineal body, normal urethral meatus. Atrophic vagina without lesions or discharge. Uterus/cervix surgically absent; No significant cystocele or rectocele. Bimanual exam showed vaginal cuff intact, nontender. Adnexa without masses or tenderness. Urethra and bladder normal.  EXTREMITIES: No clubbing " cyanosis or edema.    AP  Routine gyn  -s/p normal breast exam: mammogram to be completed with PCP  -s/p normal pelvic exam:   -Pap not indicated  -STD testing: declined  -colon cancer screening: uptodate  -DEXA:  uptodate    Patient thinks danuta aHwk MD

## 2024-02-06 ENCOUNTER — TELEPHONE (OUTPATIENT)
Dept: OBSTETRICS AND GYNECOLOGY | Facility: CLINIC | Age: 69
End: 2024-02-06
Payer: MEDICARE

## 2024-02-06 NOTE — TELEPHONE ENCOUNTER
Spoke with pt.     Explained to pt that Dr. Hawk will be out of the office for 12 weeks due to an emergency and will be back mid may.     Pt verbalize understanding and scheduled appt with dr. Hawk for may 27th at 9;15am.     ----- Message from Marcela Cortes sent at 2/5/2024 11:18 AM CST -----  Type:  Sooner Apoointment Request    Caller is requesting a sooner appointment.  Caller declined first available appointment listed below.  Caller will not accept being placed on the waitlist and is requesting a message be sent to doctor.  Name of Caller:pt  When is the first available appointment?non showing   Symptoms:just needs to get in for a check up didn't know if it was too soon to make appt  Would the patient rather a call back or a response via [facility]? call  Best Call Back Number:[phone]  Additional Information:

## 2024-05-27 ENCOUNTER — OFFICE VISIT (OUTPATIENT)
Dept: OBSTETRICS AND GYNECOLOGY | Facility: CLINIC | Age: 69
End: 2024-05-27
Payer: MEDICARE

## 2024-05-27 VITALS
SYSTOLIC BLOOD PRESSURE: 144 MMHG | DIASTOLIC BLOOD PRESSURE: 74 MMHG | WEIGHT: 186.94 LBS | BODY MASS INDEX: 30.17 KG/M2

## 2024-05-27 DIAGNOSIS — Z01.419 ROUTINE GYNECOLOGICAL EXAMINATION: Primary | ICD-10-CM

## 2024-05-27 PROCEDURE — 99999 PR PBB SHADOW E&M-EST. PATIENT-LVL III: CPT | Mod: PBBFAC,,, | Performed by: OBSTETRICS & GYNECOLOGY

## 2024-05-27 PROCEDURE — 3078F DIAST BP <80 MM HG: CPT | Mod: CPTII,S$GLB,, | Performed by: OBSTETRICS & GYNECOLOGY

## 2024-05-27 PROCEDURE — 1126F AMNT PAIN NOTED NONE PRSNT: CPT | Mod: CPTII,S$GLB,, | Performed by: OBSTETRICS & GYNECOLOGY

## 2024-05-27 PROCEDURE — 1159F MED LIST DOCD IN RCRD: CPT | Mod: CPTII,S$GLB,, | Performed by: OBSTETRICS & GYNECOLOGY

## 2024-05-27 PROCEDURE — 3288F FALL RISK ASSESSMENT DOCD: CPT | Mod: CPTII,S$GLB,, | Performed by: OBSTETRICS & GYNECOLOGY

## 2024-05-27 PROCEDURE — 3008F BODY MASS INDEX DOCD: CPT | Mod: CPTII,S$GLB,, | Performed by: OBSTETRICS & GYNECOLOGY

## 2024-05-27 PROCEDURE — 4010F ACE/ARB THERAPY RXD/TAKEN: CPT | Mod: CPTII,S$GLB,, | Performed by: OBSTETRICS & GYNECOLOGY

## 2024-05-27 PROCEDURE — 99397 PER PM REEVAL EST PAT 65+ YR: CPT | Mod: S$GLB,,, | Performed by: OBSTETRICS & GYNECOLOGY

## 2024-05-27 PROCEDURE — 3077F SYST BP >= 140 MM HG: CPT | Mod: CPTII,S$GLB,, | Performed by: OBSTETRICS & GYNECOLOGY

## 2024-05-27 PROCEDURE — 1101F PT FALLS ASSESS-DOCD LE1/YR: CPT | Mod: CPTII,S$GLB,, | Performed by: OBSTETRICS & GYNECOLOGY

## 2024-05-27 NOTE — PROGRESS NOTES
68 yo female s/p HYST in 1992 for uterine fibroids who presents for routine gyn visit.  Her ovaries are still intact.  . Declines STD testing.  PCP is Dr. Pimentel who orders her mammogram. Gets it at [facility].  Per patient was wnl.  Reports that colonoscopy is also uptodate  The patient has no other gyn complaints today.     ROS:  GENERAL: Denies weight gain or weight loss. Feeling well overall.   SKIN: Denies rash or lesions. vitiligo  HEAD: Denies head injury or headache.   CHEST: Denies chest pain or shortness of breath.   CARDIOVASCULAR: Denies palpitations or left sided chest pain.   ABDOMEN: No abdominal pain, constipation, diarrhea, nausea, vomiting or rectal bleeding.   URINARY: No frequency, dysuria, hematuria, or burning on urination.  REPRODUCTIVE: See HPI.   BREASTS:  denies pain, lumps, or nipple discharge.   HEMATOLOGIC: No easy bruisability or excessive bleeding.   MUSCULOSKELETAL: Denies joint pain or swelling.   NEUROLOGIC: Denies syncope or weakness.   PSYCHIATRIC: Denies depression, anxiety or mood swings.       PE  BP (!) 144/74   Wt 84.8 kg (186 lb 15.2 oz)   LMP  (LMP Unknown)   BMI 30.17 kg/m²   APPEARANCE: Well nourished, well developed, in no acute distress.  SKIN: Normal skin turgor, no lesions.  ABDOMEN: Soft. No tenderness or masses. No hepatosplenomegaly. No hernias.  BREASTS: Symmetrical, no skin changes or visible lesions. No palpable masses, nipple discharge or adenopathy bilaterally.  PELVIC: Normal external female genitalia without lesions. Light colored areas noted on bilateral vulvar. Normal hair distribution. Adequate perineal body, normal urethral meatus. Atrophic vagina without lesions or discharge. Uterus/cervix surgically absent; No significant cystocele or rectocele. Bimanual exam showed vaginal cuff intact, nontender. Adnexa without masses or tenderness. Urethra and bladder normal.  EXTREMITIES: No clubbing cyanosis or edema.    AP  Routine gyn  -s/p normal breast  exam: mammogram to be completed with PCP  -s/p normal pelvic exam:   -Pap not indicated  -STD testing: declined  -colon cancer screening: uptodate  -DEXA:  isa Hawk MD

## 2025-03-21 ENCOUNTER — HOSPITAL ENCOUNTER (EMERGENCY)
Facility: HOSPITAL | Age: 70
Discharge: HOME | End: 2025-03-22
Attending: EMERGENCY MEDICINE
Payer: MEDICARE

## 2025-03-21 DIAGNOSIS — R42 DIZZINESS: ICD-10-CM

## 2025-03-21 DIAGNOSIS — I10 UNCONTROLLED HYPERTENSION: ICD-10-CM

## 2025-03-21 DIAGNOSIS — R73.9 HYPERGLYCEMIA: Primary | ICD-10-CM

## 2025-03-21 LAB
ALBUMIN SERPL BCP-MCNC: 3.6 G/DL (ref 3.5–5.2)
ALP SERPL-CCNC: 103 U/L (ref 40–150)
ALT SERPL W/O P-5'-P-CCNC: 55 U/L (ref 10–44)
ANION GAP SERPL CALC-SCNC: 16 MMOL/L (ref 8–16)
AST SERPL-CCNC: 32 U/L (ref 10–40)
B-OH-BUTYR BLD STRIP-SCNC: 1.3 MMOL/L (ref 0–0.5)
BACTERIA #/AREA URNS HPF: NORMAL /HPF
BASOPHILS # BLD AUTO: 0.04 K/UL (ref 0–0.2)
BASOPHILS NFR BLD: 0.5 % (ref 0–1.9)
BILIRUB SERPL-MCNC: 0.5 MG/DL (ref 0.1–1)
BILIRUB UR QL STRIP: NEGATIVE
BUN SERPL-MCNC: 21 MG/DL (ref 8–23)
CALCIUM SERPL-MCNC: 9.2 MG/DL (ref 8.7–10.5)
CHLORIDE SERPL-SCNC: 99 MMOL/L (ref 95–110)
CLARITY UR: CLEAR
CO2 SERPL-SCNC: 18 MMOL/L (ref 23–29)
COLOR UR: YELLOW
CREAT SERPL-MCNC: 1.4 MG/DL (ref 0.5–1.4)
DIFFERENTIAL METHOD BLD: ABNORMAL
EOSINOPHIL # BLD AUTO: 0.1 K/UL (ref 0–0.5)
EOSINOPHIL NFR BLD: 1 % (ref 0–8)
ERYTHROCYTE [DISTWIDTH] IN BLOOD BY AUTOMATED COUNT: 14.3 % (ref 11.5–14.5)
EST. GFR  (NO RACE VARIABLE): 41 ML/MIN/1.73 M^2
GLUCOSE SERPL-MCNC: 550 MG/DL (ref 70–110)
GLUCOSE UR QL STRIP: ABNORMAL
HCT VFR BLD AUTO: 41.7 % (ref 37–48.5)
HGB BLD-MCNC: 13.5 G/DL (ref 12–16)
HGB UR QL STRIP: ABNORMAL
IMM GRANULOCYTES # BLD AUTO: 0.02 K/UL (ref 0–0.04)
IMM GRANULOCYTES NFR BLD AUTO: 0.3 % (ref 0–0.5)
KETONES UR QL STRIP: ABNORMAL
LEUKOCYTE ESTERASE UR QL STRIP: NEGATIVE
LYMPHOCYTES # BLD AUTO: 2.9 K/UL (ref 1–4.8)
LYMPHOCYTES NFR BLD: 36.6 % (ref 18–48)
MCH RBC QN AUTO: 25.8 PG (ref 27–31)
MCHC RBC AUTO-ENTMCNC: 32.4 G/DL (ref 32–36)
MCV RBC AUTO: 80 FL (ref 82–98)
MICROSCOPIC COMMENT: NORMAL
MONOCYTES # BLD AUTO: 0.7 K/UL (ref 0.3–1)
MONOCYTES NFR BLD: 8.7 % (ref 4–15)
NEUTROPHILS # BLD AUTO: 4.1 K/UL (ref 1.8–7.7)
NEUTROPHILS NFR BLD: 52.9 % (ref 38–73)
NITRITE UR QL STRIP: NEGATIVE
NRBC BLD-RTO: 0 /100 WBC
PH UR STRIP: 5 [PH] (ref 5–8)
PLATELET # BLD AUTO: 172 K/UL (ref 150–450)
PMV BLD AUTO: ABNORMAL FL (ref 9.2–12.9)
POCT GLUCOSE: 279 MG/DL (ref 70–110)
POCT GLUCOSE: 346 MG/DL (ref 70–110)
POCT GLUCOSE: >500 MG/DL (ref 70–110)
POTASSIUM SERPL-SCNC: 4 MMOL/L (ref 3.5–5.1)
PROT SERPL-MCNC: 8.2 G/DL (ref 6–8.4)
PROT UR QL STRIP: NEGATIVE
RBC # BLD AUTO: 5.23 M/UL (ref 4–5.4)
RBC #/AREA URNS HPF: 2 /HPF (ref 0–4)
SODIUM SERPL-SCNC: 133 MMOL/L (ref 136–145)
SP GR UR STRIP: >1.03 (ref 1–1.03)
SQUAMOUS #/AREA URNS HPF: 0 /HPF
URN SPEC COLLECT METH UR: ABNORMAL
UROBILINOGEN UR STRIP-ACNC: NEGATIVE EU/DL
WBC # BLD AUTO: 7.81 K/UL (ref 3.9–12.7)
WBC #/AREA URNS HPF: 0 /HPF (ref 0–5)
YEAST URNS QL MICRO: NORMAL

## 2025-03-21 PROCEDURE — 96361 HYDRATE IV INFUSION ADD-ON: CPT

## 2025-03-21 PROCEDURE — 93010 ELECTROCARDIOGRAM REPORT: CPT | Mod: ,,, | Performed by: STUDENT IN AN ORGANIZED HEALTH CARE EDUCATION/TRAINING PROGRAM

## 2025-03-21 PROCEDURE — 80053 COMPREHEN METABOLIC PANEL: CPT | Performed by: PHYSICIAN ASSISTANT

## 2025-03-21 PROCEDURE — 82010 KETONE BODYS QUAN: CPT

## 2025-03-21 PROCEDURE — 85025 COMPLETE CBC W/AUTO DIFF WBC: CPT | Performed by: PHYSICIAN ASSISTANT

## 2025-03-21 PROCEDURE — 99284 EMERGENCY DEPT VISIT MOD MDM: CPT | Mod: 25

## 2025-03-21 PROCEDURE — 63600175 PHARM REV CODE 636 W HCPCS

## 2025-03-21 PROCEDURE — 96376 TX/PRO/DX INJ SAME DRUG ADON: CPT

## 2025-03-21 PROCEDURE — 63600175 PHARM REV CODE 636 W HCPCS: Performed by: EMERGENCY MEDICINE

## 2025-03-21 PROCEDURE — 81000 URINALYSIS NONAUTO W/SCOPE: CPT

## 2025-03-21 PROCEDURE — 82962 GLUCOSE BLOOD TEST: CPT

## 2025-03-21 PROCEDURE — 96374 THER/PROPH/DIAG INJ IV PUSH: CPT

## 2025-03-21 PROCEDURE — 93005 ELECTROCARDIOGRAM TRACING: CPT

## 2025-03-21 RX ORDER — LABETALOL HYDROCHLORIDE 5 MG/ML
20 INJECTION, SOLUTION INTRAVENOUS
Status: DISCONTINUED | OUTPATIENT
Start: 2025-03-21 | End: 2025-03-21

## 2025-03-21 RX ORDER — LISINOPRIL 10 MG/1
40 TABLET ORAL
Status: DISCONTINUED | OUTPATIENT
Start: 2025-03-21 | End: 2025-03-21

## 2025-03-21 RX ADMIN — SODIUM CHLORIDE, POTASSIUM CHLORIDE, SODIUM LACTATE AND CALCIUM CHLORIDE 1000 ML: 600; 310; 30; 20 INJECTION, SOLUTION INTRAVENOUS at 07:03

## 2025-03-21 RX ADMIN — HUMAN INSULIN 4 UNITS: 100 INJECTION, SOLUTION SUBCUTANEOUS at 11:03

## 2025-03-21 RX ADMIN — HUMAN INSULIN 8 UNITS: 100 INJECTION, SOLUTION SUBCUTANEOUS at 09:03

## 2025-03-21 NOTE — FIRST PROVIDER EVALUATION
" Emergency Department TeleTriage Encounter Note      CHIEF COMPLAINT    Chief Complaint   Patient presents with    Dizziness     Pt states that she has been having intermittent dizziness for a while, pt ambulatory to triage. Pt states that her sugar and blood pressure have been running high          VITAL SIGNS   Initial Vitals [03/21/25 1817]   BP Pulse Resp Temp SpO2   (!) 196/81 76 18 97.6 °F (36.4 °C) 100 %      MAP       --            ALLERGIES    Review of patient's allergies indicates:   Allergen Reactions    Januvia [sitagliptin]     Lipitor [atorvastatin]      Shoulder pain    Norvasc [amlodipine]      Weak, dizzy    Oxycodone-acetaminophen     Hydralazine analogues Anxiety       PROVIDER TRIAGE NOTE  Patient presents with complaint of intermittent dizziness that started over a year ago. She reports associated visual changes. No headaches. Reports no speaking difficulty. Reports "weakness" around "my stomach area". She states her sugar has been running high "for a long time".       Phy:   Constitutional: well nourished, well developed, appearing stated age, NAD        Initial orders will be placed and care will be transferred to an alternate provider when patient is roomed for a full evaluation. Any additional orders and the final disposition will be determined by that provider.        ORDERS  Labs Reviewed - No data to display    ED Orders (720h ago, onward)      None              Virtual Visit Note: The provider triage portion of this emergency department evaluation and documentation was performed via [employer], a HIPAA-compliant telemedicine application, in concert with a tele-presenter in the room. A face to face patient evaluation with one of my colleagues will occur once the patient is placed in an emergency department room.      DISCLAIMER: This note was prepared with [employer] voice recognition transcription software. Garbled syntax, mangled pronouns, and other bizarre constructions may be " attributed to that software system.

## 2025-03-21 NOTE — ED TRIAGE NOTES
Pt reports dizziness for the past year. She believes this is because her blood sugar has been running high. She states she is compliant with her meds. She has an appt next week with a specialist. She is c/o weakness around her eyes. She is frequently thirsty

## 2025-03-22 VITALS
SYSTOLIC BLOOD PRESSURE: 172 MMHG | OXYGEN SATURATION: 100 % | TEMPERATURE: 98 F | WEIGHT: 179.88 LBS | DIASTOLIC BLOOD PRESSURE: 82 MMHG | BODY MASS INDEX: 29.97 KG/M2 | RESPIRATION RATE: 20 BRPM | HEIGHT: 65 IN | HEART RATE: 75 BPM

## 2025-03-22 LAB — POCT GLUCOSE: 241 MG/DL (ref 70–110)

## 2025-03-22 PROCEDURE — 82962 GLUCOSE BLOOD TEST: CPT

## 2025-03-22 NOTE — ED NOTES
Pt alert and oriented x 3.  Stated she felt bad and dizzy earlier today.  Stated her blood sugar has been running high since August.  She has an appointment with her regular physician on Tuesday.  Denies nausea or vomiting.  Pt connected to cardiac monitor. Denies chest pan.

## 2025-03-22 NOTE — ED PROVIDER NOTES
"Encounter Date: 3/21/2025       History     Chief Complaint   Patient presents with    Dizziness     Pt states that she has been having intermittent dizziness for a while, pt ambulatory to triage. Pt states that her sugar and blood pressure have been running high        69-year-old female with past medical history of type 2 diabetes, hyperlipidemia, hypertension presents to the ED for further evaluation of "feeling unwell" today. Notes intermittent episodes of feeling lightheaded and weak. No extremity weakness, numbness or tingling. States these episodes have been occurring since August last year. No neuro deficits, headache, feeling dizzy like the room "spinning." Notes her glucose levels have been high, ranging between 300-400's at home. States she is on a sliding scale and has been complaint with her medications, including her insulin. Pt notes ever since she started on Humalog her glucose levels are high, concern that is not properly controlled. States " I don't know if I'm injecting it right with the pen." No N/V, chest pain, SOB, abdominal pain. No other acute complaints today.    The history is provided by the patient.     Review of patient's allergies indicates:   Allergen Reactions    Januvia [sitagliptin]     Lipitor [atorvastatin]      Shoulder pain    Norvasc [amlodipine]      Weak, dizzy    Oxycodone-acetaminophen     Hydralazine analogues Anxiety     Past Medical History:   Diagnosis Date    Diabetes mellitus type II     DM (diabetes mellitus)     Heart attack 03/09/2020    HLD (hyperlipidemia)     Hyperlipidemia     Hypertension     Obesity     Peripheral neuropathy      Past Surgical History:   Procedure Laterality Date    ANGIOGRAM, CORONARY, WITH LEFT HEART CATHETERIZATION  3/9/2020    Procedure: Angiogram, Coronary, with Left Heart Cath;  Surgeon: Magnus Baird MD;  Location: [facility] C[facility]EP;  Service: Cardiology;;    BACK SURGERY      HYSTERECTOMY       Family History   Problem Relation " Name Age of Onset    Diabetes Mother      Diabetes Sister      Diabetes Brother      Diabetes Sister      Diabetes Sister      Diabetes Sister      Diabetes Sister      Hypertension Unknown      Coronary artery disease Neg Hx      Cancer Neg Hx       Social History[1]  Review of Systems   Constitutional:  Negative for chills and fever.   Respiratory:  Negative for shortness of breath.    Cardiovascular:  Negative for chest pain.   Gastrointestinal:  Negative for abdominal distention, abdominal pain, nausea and vomiting.   Genitourinary:  Negative for dysuria, frequency and hematuria.   Musculoskeletal:  Negative for neck pain and neck stiffness.   Neurological:  Positive for light-headedness. Negative for dizziness and headaches.       Physical Exam     Initial Vitals [03/21/25 1817]   BP Pulse Resp Temp SpO2   (!) 196/81 76 18 97.6 °F (36.4 °C) 100 %      MAP       --         Physical Exam    Vitals reviewed.  Constitutional: She appears well-developed and well-nourished. She is not diaphoretic. No distress.   HENT:   Head: Atraumatic.   Eyes: EOM are normal.   Neck: Neck supple.   Cardiovascular:  Normal rate and normal heart sounds.           Pulmonary/Chest: Breath sounds normal. No respiratory distress. She has no wheezes.   Abdominal: Abdomen is soft. She exhibits no distension. There is no abdominal tenderness. There is no rebound and no guarding.   Musculoskeletal:      Cervical back: Neck supple.     Neurological: She is alert and oriented to person, place, and time. GCS score is 15. GCS eye subscore is 4. GCS verbal subscore is 5. GCS motor subscore is 6.   Moves all extremities and carries on conversation. CN- II: PERRL; III/IV/VI: EOMI w/out evidence of nystagmus; V: no deficits appreciated to light touch bilateral face; VII: no facial weakness, no facial asymmetry. Eyebrow raise symmetric. Smile symmetric; IX/X: palate midline, and raises symmetrically; XI: shoulder shrug 5/5 bilaterally; XII: tongue is  midline w/out asymmetry. Strength 5/5 to bilateral upper and lower extremities, sensation intact to light touch,       Skin: Skin is warm. Capillary refill takes less than 2 seconds.         ED Course   Procedures  Labs Reviewed   CBC W/ AUTO DIFFERENTIAL - Abnormal       Result Value    WBC 7.81      RBC 5.23      Hemoglobin 13.5      Hematocrit 41.7      MCV 80 (*)     MCH 25.8 (*)     MCHC 32.4      RDW 14.3      Platelets 172      MPV SEE COMMENT      Immature Granulocytes 0.3      Gran # (ANC) 4.1      Immature Grans (Abs) 0.02      Lymph # 2.9      Mono # 0.7      Eos # 0.1      Baso # 0.04      nRBC 0      Gran % 52.9      Lymph % 36.6      Mono % 8.7      Eosinophil % 1.0      Basophil % 0.5      Differential Method Automated     COMPREHENSIVE METABOLIC PANEL - Abnormal    Sodium 133 (*)     Potassium 4.0      Chloride 99      CO2 18 (*)     Glucose 550 (*)     BUN 21      Creatinine 1.4      Calcium 9.2      Total Protein 8.2      Albumin 3.6      Total Bilirubin 0.5      Alkaline Phosphatase 103      AST 32      ALT 55 (*)     eGFR 41 (*)     Anion Gap 16      Narrative:     GLU critical result(s) called and verbal readback obtained from DEV MARQUEZ LPN by [facility] 03/21/2025 20:54   BETA - HYDROXYBUTYRATE, SERUM - Abnormal    Beta-Hydroxybutyrate 1.3 (*)    URINALYSIS, REFLEX TO URINE CULTURE - Abnormal    Specimen UA Urine, Clean Catch      Color, UA Yellow      Appearance, UA Clear      pH, UA 5.0      Specific Gravity, UA >1.030 (*)     Protein, UA Negative      Glucose, UA 4+ (*)     Ketones, UA 1+ (*)     Bilirubin (UA) Negative      Occult Blood UA Trace (*)     Nitrite, UA Negative      Urobilinogen, UA Negative      Leukocytes, UA Negative      Narrative:     Specimen Source->Urine   POCT GLUCOSE - Abnormal    POCT Glucose >500 (*)    POCT GLUCOSE - Abnormal    POCT Glucose 346 (*)    POCT GLUCOSE - Abnormal    POCT Glucose 279 (*)    POCT GLUCOSE - Abnormal    POCT Glucose 241 (*)    URINALYSIS  "MICROSCOPIC    RBC, UA 2      WBC, UA 0      Bacteria None      Yeast, UA None      Squam Epithel, UA 0      Microscopic Comment SEE COMMENT      Narrative:     Specimen Source->Urine   POCT GLUCOSE MONITORING CONTINUOUS          Imaging Results    None          Medications   lactated ringers bolus 1,000 mL (0 mLs Intravenous Stopped 3/21/25 2044)   insulin regular injection 8 Units 0.08 mL (8 Units Intravenous Given 3/21/25 2127)   insulin regular injection 4 Units 0.04 mL (4 Units Intravenous Given 3/21/25 2322)     Medical Decision Making  Differential Diagnosis includes, but is not limited to:  DKA, hyperglycemic hyperosmotic non-ketotic state, dehydration, electrolyte abnormalities, metabolic derangement, COVID, pneumonia, UTI/pyelonephritis, medication non adherence, pancreatitis, CVA, MI/ACS, nonketotic hypoglycemia, sepsis/infection, UTI, dietary cause/increased sugar intake, device malfunction, alcohol/drug abuse,     ED management      69-year-old female with past medical history of type 2 diabetes, hyperlipidemia, hypertension presents to the ED for further evaluation of "feeling unwell" today. Patient is not toxic appearing, hemodynamically stable and resting comfortably on bed. Patient is well-appearing.  Awake and alert.  Hypertensive Afebrile with vitals WNL. No distress on exam.  Today's lab without evidence of hyperglycemia, glucose> 500. CMP without anion gap. No evidence of DKA. UA without evidence of UTI. Considered possible causes of DKA to include infection (pancreatitis, UTI, pneumonia), infarction / ischemia (acute coronary syndrome, cerebral vascular accident), medication non-compliance with insulin therapy, illicit substance abuse, iatrogenic (including prescription medications and drug-drug interactions), idiopathic causes. Pt provided with dose of insulin and IV fluids. Pt's glucose improved throughout ED stay. Pt has a schedule apt with endocrine in 3 days. Advised to continue monitoring " her glucose levels at home.    I have discussed the specifics of the workup with the patient and the patient has verbalized understanding of the details of the workup, the diagnosis, the treatment plan, and the need for outpatient follow-up with PCP. ED precautions given. Discussed with pt about returning to the ED, if symptoms fail to improve or worsen. Care of patient also discussed with Dr. Gan who agrees with assessment and plan.    RESULTS:  Documented in ED course.   Labs/ekg interpreted by myself and Dr. Gan.       Voice recognition software utilized in this note. Typographical and content errors may occur with this process. While efforts are made to detect and correct such errors, in some cases errors will persist. For this reason, wording in this document should be considered in the proper context and not strictly verbatim.             Amount and/or Complexity of Data Reviewed  Labs:  Decision-making details documented in ED Course.    Risk  OTC drugs.               ED Course as of 03/22/25 0028   Fri Mar 21, 2025   1910 POCT Glucose(!!): >500 [NW]   1911 BP(!): 196/81 [NW]   1911 Temp: 97.6 °F (36.4 °C) [NW]   1911 Pulse: 76 [NW]   1911 Resp: 18 [NW]   1911 SpO2: 100 % [NW]   2025 Beta-Hydroxybutyrate(!): 1.3  Elevated  [NW]   2109 Glucose(!!): 550 [NW]   2135 Sodium(!): 133 [NW]   2135 Comp. Metabolic Panel(!!)  Anion gap WNL.  [NW]   2216 POCT Glucose(!): 346 [NW]      ED Course User Index  [NW] Yael Sosa PA-C                           Clinical Impression:  Final diagnoses:  [R42] Dizziness  [I10] Uncontrolled hypertension  [R73.9] Hyperglycemia (Primary)          ED Disposition Condition    Discharge Stable          ED Prescriptions    None       Follow-up Information    None              [1]   Social History  Tobacco Use    Smoking status: Never    Smokeless tobacco: Never   Substance Use Topics    Alcohol use: No    Drug use: No        Yael Sosa PA-C  03/22/25 0028

## 2025-03-22 NOTE — ED NOTES
MD prieto updated on pt's current accucheck prior to 5 units insulin order, awaiting response and updated orders at this time.

## 2025-03-22 NOTE — DISCHARGE INSTRUCTIONS
Ms. Rhoades,     Thank you for letting me care for you today! It was nice meeting you, and I hope you feel better soon.   If you would like access to your chart and what was done today please utilize the [facility].   Please don't hesitate to return if your symptoms worsen or you develop any other worrisome symptoms.    Our goal in the emergency department is to always give you outstanding care and exceptional service. You may receive a survey by mail or e-mail in the next week regarding your experience in our ED. We would greatly appreciate you completing and returning the survey. Your feedback provides us with a way to recognize our staff who give very good care and it helps us learn how to improve when your experience was below our aspiration of excellence.     Sincerely,    Yael Sosa PA-C  Emergency Department Physician Assistant  [facility] [place], [facility] [place]

## 2025-03-24 LAB
OHS QRS DURATION: 82 MS
OHS QTC CALCULATION: 462 MS

## 2025-04-12 ENCOUNTER — HOSPITAL ENCOUNTER (EMERGENCY)
Facility: HOSPITAL | Age: 70
Discharge: HOME | End: 2025-04-12
Attending: EMERGENCY MEDICINE
Payer: MEDICARE

## 2025-04-12 VITALS
TEMPERATURE: 98 F | SYSTOLIC BLOOD PRESSURE: 176 MMHG | HEART RATE: 77 BPM | OXYGEN SATURATION: 99 % | DIASTOLIC BLOOD PRESSURE: 86 MMHG | RESPIRATION RATE: 20 BRPM

## 2025-04-12 DIAGNOSIS — R53.1 WEAKNESS: ICD-10-CM

## 2025-04-12 DIAGNOSIS — I10 CHRONIC HYPERTENSION: Primary | ICD-10-CM

## 2025-04-12 DIAGNOSIS — I10 ESSENTIAL HYPERTENSION: ICD-10-CM

## 2025-04-12 LAB
ABSOLUTE EOSINOPHIL (OHS): 0.11 K/UL
ABSOLUTE MONOCYTE (OHS): 0.44 K/UL (ref 0.3–1)
ABSOLUTE NEUTROPHIL COUNT (OHS): 3.64 K/UL (ref 1.8–7.7)
ALBUMIN SERPL BCP-MCNC: 3.7 G/DL (ref 3.5–5.2)
ALP SERPL-CCNC: 109 UNIT/L (ref 40–150)
ALT SERPL W/O P-5'-P-CCNC: 117 UNIT/L (ref 10–44)
ANION GAP (OHS): 12 MMOL/L (ref 8–16)
AST SERPL-CCNC: 70 UNIT/L (ref 11–45)
BACTERIA #/AREA URNS AUTO: NORMAL /HPF
BASOPHILS # BLD AUTO: 0.02 K/UL
BASOPHILS NFR BLD AUTO: 0.3 %
BILIRUB SERPL-MCNC: 0.3 MG/DL (ref 0.1–1)
BILIRUB UR QL STRIP.AUTO: NEGATIVE
BNP SERPL-MCNC: 153 PG/ML (ref 0–99)
BUN SERPL-MCNC: 17 MG/DL (ref 8–23)
CALCIUM SERPL-MCNC: 9.5 MG/DL (ref 8.7–10.5)
CHLORIDE SERPL-SCNC: 103 MMOL/L (ref 95–110)
CLARITY UR: CLEAR
CO2 SERPL-SCNC: 22 MMOL/L (ref 23–29)
COLOR UR AUTO: YELLOW
CREAT SERPL-MCNC: 1.2 MG/DL (ref 0.5–1.4)
ERYTHROCYTE [DISTWIDTH] IN BLOOD BY AUTOMATED COUNT: 14.7 % (ref 11.5–14.5)
GFR SERPLBLD CREATININE-BSD FMLA CKD-EPI: 49 ML/MIN/1.73/M2
GLUCOSE SERPL-MCNC: 362 MG/DL (ref 70–110)
GLUCOSE UR QL STRIP: ABNORMAL
HCT VFR BLD AUTO: 43.9 % (ref 37–48.5)
HGB BLD-MCNC: 13.9 GM/DL (ref 12–16)
HGB UR QL STRIP: NEGATIVE
IMM GRANULOCYTES # BLD AUTO: 0.02 K/UL (ref 0–0.04)
IMM GRANULOCYTES NFR BLD AUTO: 0.3 % (ref 0–0.5)
KETONES UR QL STRIP: ABNORMAL
LACTATE SERPL-SCNC: 1.3 MMOL/L (ref 0.5–2.2)
LEUKOCYTE ESTERASE UR QL STRIP: NEGATIVE
LYMPHOCYTES # BLD AUTO: 2.67 K/UL (ref 1–4.8)
MCH RBC QN AUTO: 25.6 PG (ref 27–31)
MCHC RBC AUTO-ENTMCNC: 31.7 G/DL (ref 32–36)
MCV RBC AUTO: 81 FL (ref 82–98)
MICROSCOPIC COMMENT: NORMAL
NITRITE UR QL STRIP: NEGATIVE
NUCLEATED RBC (/100WBC) (OHS): 0 /100 WBC
PH UR STRIP: 5 [PH]
PLATELET # BLD AUTO: 193 K/UL (ref 150–450)
PMV BLD AUTO: 11.9 FL (ref 9.2–12.9)
POCT GLUCOSE: 388 MG/DL (ref 70–110)
POTASSIUM SERPL-SCNC: 3.4 MMOL/L (ref 3.5–5.1)
PROT SERPL-MCNC: 7.9 GM/DL (ref 6–8.4)
PROT UR QL STRIP: NEGATIVE
RBC # BLD AUTO: 5.42 M/UL (ref 4–5.4)
RBC #/AREA URNS AUTO: 1 /HPF (ref 0–4)
RELATIVE EOSINOPHIL (OHS): 1.6 %
RELATIVE LYMPHOCYTE (OHS): 38.7 % (ref 18–48)
RELATIVE MONOCYTE (OHS): 6.4 % (ref 4–15)
RELATIVE NEUTROPHIL (OHS): 52.7 % (ref 38–73)
SODIUM SERPL-SCNC: 137 MMOL/L (ref 136–145)
SP GR UR STRIP: 1.02
SQUAMOUS #/AREA URNS AUTO: 1 /HPF
TROPONIN I SERPL DL<=0.01 NG/ML-MCNC: 0.01 NG/ML
TROPONIN I SERPL DL<=0.01 NG/ML-MCNC: 0.01 NG/ML
UROBILINOGEN UR STRIP-ACNC: NEGATIVE EU/DL
WBC # BLD AUTO: 6.9 K/UL (ref 3.9–12.7)
WBC #/AREA URNS AUTO: 1 /HPF (ref 0–5)
YEAST UR QL AUTO: NORMAL /HPF

## 2025-04-12 PROCEDURE — 82962 GLUCOSE BLOOD TEST: CPT

## 2025-04-12 PROCEDURE — 25000003 PHARM REV CODE 250: Performed by: EMERGENCY MEDICINE

## 2025-04-12 PROCEDURE — 83605 ASSAY OF LACTIC ACID: CPT | Performed by: EMERGENCY MEDICINE

## 2025-04-12 PROCEDURE — 96374 THER/PROPH/DIAG INJ IV PUSH: CPT

## 2025-04-12 PROCEDURE — 80053 COMPREHEN METABOLIC PANEL: CPT | Performed by: EMERGENCY MEDICINE

## 2025-04-12 PROCEDURE — 83880 ASSAY OF NATRIURETIC PEPTIDE: CPT | Performed by: EMERGENCY MEDICINE

## 2025-04-12 PROCEDURE — 84484 ASSAY OF TROPONIN QUANT: CPT | Performed by: EMERGENCY MEDICINE

## 2025-04-12 PROCEDURE — 85025 COMPLETE CBC W/AUTO DIFF WBC: CPT | Performed by: EMERGENCY MEDICINE

## 2025-04-12 PROCEDURE — 96361 HYDRATE IV INFUSION ADD-ON: CPT

## 2025-04-12 PROCEDURE — 93005 ELECTROCARDIOGRAM TRACING: CPT

## 2025-04-12 PROCEDURE — 99284 EMERGENCY DEPT VISIT MOD MDM: CPT | Mod: 25

## 2025-04-12 PROCEDURE — 63600175 PHARM REV CODE 636 W HCPCS: Performed by: EMERGENCY MEDICINE

## 2025-04-12 PROCEDURE — 96376 TX/PRO/DX INJ SAME DRUG ADON: CPT

## 2025-04-12 PROCEDURE — 81003 URINALYSIS AUTO W/O SCOPE: CPT | Performed by: EMERGENCY MEDICINE

## 2025-04-12 RX ORDER — HYDROXYZINE HYDROCHLORIDE 25 MG/1
25 TABLET, FILM COATED ORAL 4 TIMES DAILY PRN
Qty: 12 TABLET | Refills: 0 | Status: SHIPPED | OUTPATIENT
Start: 2025-04-12

## 2025-04-12 RX ORDER — LABETALOL HYDROCHLORIDE 5 MG/ML
20 INJECTION, SOLUTION INTRAVENOUS
Status: COMPLETED | OUTPATIENT
Start: 2025-04-12 | End: 2025-04-12

## 2025-04-12 RX ORDER — LABETALOL HYDROCHLORIDE 5 MG/ML
10 INJECTION, SOLUTION INTRAVENOUS
Status: COMPLETED | OUTPATIENT
Start: 2025-04-12 | End: 2025-04-12

## 2025-04-12 RX ORDER — HYDROXYZINE PAMOATE 25 MG/1
25 CAPSULE ORAL
Status: COMPLETED | OUTPATIENT
Start: 2025-04-12 | End: 2025-04-12

## 2025-04-12 RX ORDER — NIFEDIPINE 30 MG/1
60 TABLET, EXTENDED RELEASE ORAL DAILY
Qty: 60 TABLET | Refills: 0 | Status: SHIPPED | OUTPATIENT
Start: 2025-04-12 | End: 2025-05-12

## 2025-04-12 RX ADMIN — LABETALOL HYDROCHLORIDE 20 MG: 5 INJECTION INTRAVENOUS at 08:04

## 2025-04-12 RX ADMIN — SODIUM CHLORIDE 500 ML: 0.9 INJECTION, SOLUTION INTRAVENOUS at 04:04

## 2025-04-12 RX ADMIN — LABETALOL HYDROCHLORIDE 10 MG: 5 INJECTION INTRAVENOUS at 07:04

## 2025-04-12 RX ADMIN — HYDROXYZINE PAMOATE 25 MG: 25 CAPSULE ORAL at 04:04

## 2025-04-12 NOTE — ED PROVIDER NOTES
Encounter Date: 4/12/2025       History     Chief Complaint   Patient presents with    Weakness    Neck Pain     Weakness and neck pain with swelling to feet. Pt states feels like something is wrong but she doesn't know what     Julia Rhoades is a 69 y.o. female who  has a past medical history of Diabetes mellitus type II, DM (diabetes mellitus), Heart attack (03/09/2020), HLD (hyperlipidemia), Hyperlipidemia, Hypertension, Obesity, and Peripheral neuropathy.    The patient presents to the ED due to multiple complaints.  She reports that she has noticed some mild swelling of her legs and feeling generally weak.  Her symptoms have been going on for the past 8 months.  She states today more acutely she was feeling weakness in her chest and her stomach.  She has been having intermittent neck pain although this has not really present right now.  She is here with her son.  She denies any overt pain when asked she denies any dizziness but does report being evaluated for dizziness a couple of weeks ago and her blood sugar was noted to be high.  She denies any exacerbating or relieving factors.  Patient's son at bedside states that patient is under a lot of stress lately.     The history is provided by the patient and a relative.     Review of patient's allergies indicates:   Allergen Reactions    Januvia [sitagliptin]     Lipitor [atorvastatin]      Shoulder pain    Norvasc [amlodipine]      Weak, dizzy    Oxycodone-acetaminophen     Hydralazine analogues Anxiety     Past Medical History:   Diagnosis Date    Diabetes mellitus type II     DM (diabetes mellitus)     Heart attack 03/09/2020    HLD (hyperlipidemia)     Hyperlipidemia     Hypertension     Obesity     Peripheral neuropathy      Past Surgical History:   Procedure Laterality Date    ANGIOGRAM, CORONARY, WITH LEFT HEART CATHETERIZATION  3/9/2020    Procedure: Angiogram, Coronary, with Left Heart Cath;  Surgeon: Magnus Baird MD;  Location: [facility] CATH  LAB/EP;  Service: Cardiology;;    BACK SURGERY      HYSTERECTOMY       Family History   Problem Relation Name Age of Onset    Diabetes Mother      Diabetes Sister      Diabetes Brother      Diabetes Sister      Diabetes Sister      Diabetes Sister      Diabetes Sister      Hypertension Unknown      Coronary artery disease Neg Hx      Cancer Neg Hx       Social History[1]  Review of Systems   Constitutional:  Positive for fatigue. Negative for chills and fever.   HENT:  Negative for sore throat.    Respiratory:  Negative for cough and shortness of breath.    Cardiovascular:  Positive for leg swelling. Negative for chest pain.   Gastrointestinal:  Negative for nausea and vomiting.   Genitourinary:  Negative for dysuria, frequency and urgency.   Musculoskeletal:  Negative for back pain and neck pain.   Skin:  Negative for rash and wound.   Neurological:  Positive for weakness. Negative for syncope.   Hematological:  Does not bruise/bleed easily.   Psychiatric/Behavioral:  Negative for agitation.        Physical Exam     Initial Vitals [04/12/25 1602]   BP Pulse Resp Temp SpO2   (!) 199/83 69 18 98 °F (36.7 °C) 100 %      MAP       --         Physical Exam    Constitutional:  Non-toxic appearance. No distress.   HENT:   Head: Normocephalic and atraumatic.   Neck:   Negative for carotid bruit   Cardiovascular:  Regular rhythm, S1 normal and S2 normal.           Pulmonary/Chest: Breath sounds normal. No respiratory distress.   Abdominal: Abdomen is soft. She exhibits no distension. There is no abdominal tenderness.   Musculoskeletal:      Comments: No signficant edema  DP pulses bilaterally palpable  No open wounds noted to feet     Neurological: She is alert.   CN 2-12 intact  Finger to nose within normal limits  Gait normal  Equal strength to bilateral upper extremities, SILT  Equal strength to bilateral lower extremities, SILT     Skin: Skin is warm. No rash noted.   Psychiatric: She has a normal mood and affect.          ED Course   Procedures  Labs Reviewed   COMPREHENSIVE METABOLIC PANEL - Abnormal       Result Value    Sodium 137      Potassium 3.4 (*)     Chloride 103      CO2 22 (*)     Glucose 362 (*)     BUN 17      Creatinine 1.2      Calcium 9.5      Protein Total 7.9      Albumin 3.7      Bilirubin Total 0.3            AST 70 (*)      (*)     Anion Gap 12      eGFR 49 (*)    URINALYSIS, REFLEX TO URINE CULTURE - Abnormal    Color, UA Yellow      Appearance, UA Clear      pH, UA 5.0      Spec Grav UA 1.025      Protein, UA Negative      Glucose, UA 4+ (*)     Ketones, UA Trace (*)     Bilirubin, UA Negative      Blood, UA Negative      Nitrites, UA Negative      Urobilinogen, UA Negative      Leukocyte Esterase, UA Negative     B-TYPE NATRIURETIC PEPTIDE - Abnormal     (*)    CBC WITH DIFFERENTIAL - Abnormal    WBC 6.90      RBC 5.42 (*)     HGB 13.9      HCT 43.9      MCV 81 (*)     MCH 25.6 (*)     MCHC 31.7 (*)     RDW 14.7 (*)     Platelet Count 193      MPV 11.9      Nucleated RBC 0      Neut % 52.7      Lymph % 38.7      Mono % 6.4      Eos % 1.6      Basophil % 0.3      Imm Grans % 0.3      Neut # 3.64      Lymph # 2.67      Mono # 0.44      Eos # 0.11      Baso # 0.02      Imm Grans # 0.02     POCT GLUCOSE - Abnormal    POCT Glucose 388 (*)    TROPONIN I - Normal    Troponin-I 0.007     LACTIC ACID, PLASMA - Normal    Lactic Acid Level 1.3      Narrative:     Falsely low lactic acid results can be found in samples containing >=13.0 mg/dL total bilirubin and/or >=3.5 mg/dL direct bilirubin.    TROPONIN I - Normal    Troponin-I 0.015     CBC W/ AUTO DIFFERENTIAL    Narrative:     The following orders were created for panel order CBC auto differential.  Procedure                               Abnormality         Status                     ---------                               -----------         ------                     CBC with Differential[[id number]]       Abnormal            Final  result                 Please view results for these tests on the individual orders.   URINALYSIS MICROSCOPIC    RBC, UA 1      WBC, UA 1      Bacteria, UA None      Yeast, UA None      Squamous Epithelial Cells, UA 1      Microscopic Comment              Imaging Results    None          Medications   sodium chloride 0.9% bolus 500 mL 500 mL (0 mLs Intravenous Stopped 4/12/25 1755)   hydrOXYzine pamoate capsule 25 mg (25 mg Oral Given 4/12/25 1648)   labetaloL injection 10 mg (10 mg Intravenous Given 4/12/25 1928)   labetaloL injection 20 mg (20 mg Intravenous Given 4/12/25 2021)     Medical Decision Making  Differential Diagnosis includes, but is not limited to:  CVA/TIA, vertigo, anemia/blood loss, cardiogenic shock, arrhythmia, orthostatic hypotension, dehydration, medication side effect, vitamin deficiency, liver disease, hypothyroidism, drug intoxication/withdrawal, metabolic derangement, AXS      Amount and/or Complexity of Data Reviewed  Labs: ordered. Decision-making details documented in ED Course.    Risk  Prescription drug management.  Decision regarding hospitalization.  Diagnosis or treatment significantly limited by social determinants of health.  Risk Details: Patient has a very pleasant 69-year-old female presenting today with multiple complaints.  Patient's vital signs remarkable for hypertension which improved after administration of labetalol.  Patient described no pain but reported weakness in her chest and her stomach.  Her abdominal exam is benign.  Serial troponins are negative and EKG is reassuring.  Patient states her symptoms have been going on for several months.  ACS carefully considered and felt to be unlikely.  Patient on reassessment is asymptomatic her blood pressure has improved.  Patient takes nifedipine, increase her nifedipine for better blood pressure control until she can follow up with her primary care doctor.  On my final reassessment she has no complaints her blood pressure  has improved and she appears stable for discharge.  Patient was advised to follow up with primary care take all medications as prescribed and to return to the emergency department immediately if her symptoms return or she has any other concerns..    After taking into careful account the historical factors and physical exam findings of the patient's presentation today, in conjunction with the empirical and objective data obtained on ED workup, no acute emergent medical condition has been identified. The patient appears to be low risk for an emergent medical condition and I feel it is safe and appropriate at this time for the patient to be discharged to follow-up as detailed in their discharge instructions for reevaluation and possible continued outpatient workup and management. I have discussed the specifics of the workup with the patient and the patient has verbalized understanding of the details of the workup, the diagnosis, the treatment plan, and the need for outpatient follow-up.  Although the patient has no emergent etiology today this does not preclude the development of an emergent condition so in addition, I have advised the patient that they can return to the ED and/or activate EMS at any time with worsening of their symptoms, change of their symptoms, or with any other medical complaint.  The patient remained comfortable and stable during their visit in the ED.  Discharge and follow-up instructions discussed with the patient who expressed understanding and willingness to comply with my recommendations.                 ED Course as of 04/13/25 1826   Sat Apr 12, 2025   1731 EKG: Rate 68.  Normal sinus rhythm.  No STEMI.  No ectopy [RN]   1916 CBC auto differential(!) [RN]   1916 Lactic acid, plasma [RN]   1916 Urinalysis, Reflex to Urine Culture(!) [RN]   1916 POCT glucose(!) [RN]   1916 Urinalysis Microscopic [RN]   1916 Troponin I [RN]   1916 Brain natriuretic peptide(!) [RN]   1916 Comprehensive  metabolic panel(!) [RN]   2024 Troponin I [RN]   2044 Repeat /96.  Patient is asymptomatic at this time.  She appears stable for discharge.  Discussed close follow-up with PCP for attacked her glucose control and control of blood pressure.  In the meantime will increase her nifedipine from 30 mg daily to 45.  Patient understands the need for further follow up and when to return especially for any new or worsening symptoms of concern [RN]      ED Course User Index  [RN] Rylan Frost Jr., MD                           Clinical Impression:  Final diagnoses:  [R53.1] Weakness  [I10] Chronic hypertension (Primary)          ED Disposition Condition    Discharge Stable          ED Prescriptions       Medication Sig Dispense Start Date End Date Auth. Provider    NIFEdipine (PROCARDIA-XL) 30 MG (OSM) 24 hr tablet Take 2 tablets (60 mg total) by mouth once daily. 60 tablet 4/12/2025 5/12/2025 Rylan Frost Jr., MD    hydrOXYzine HCL (ATARAX) 25 MG tablet Take 1 tablet (25 mg total) by mouth 4 (four) times daily as needed for Anxiety. 12 tablet 4/12/2025 -- Rylan Frost Jr., MD          Follow-up Information       Follow up With Specialties Details Why Contact Info    Post, De BEEBE MD Family Medicine In 3 days  [address] [facility] [id number]  [phone]              Portions of this note were dictated using voice recognition software and may contain dictation related errors in spelling/grammar/syntax not found on text review         [1]   Social History  Tobacco Use    Smoking status: Never    Smokeless tobacco: Never   Substance Use Topics    Alcohol use: No    Drug use: No        Rylan Frost Jr., MD  04/13/25 1351

## 2025-04-12 NOTE — ED NOTES
Pt sitting up, respirations even/unlabored. NAD noted. Pt denies any needs at this time. Side rails upx2, call bell within reach. Will continue to monitor

## 2025-04-12 NOTE — ED NOTES
Pt reports generalized weakness and neck pain x couple weeks. Pt also reports her pcp recently decreased her Humalog about 2 weeks ago. . Pt denies fever, sob, cp, abdominal pain , n/v/d. Pt says she's been having trouble with her sugar over the past year. NAD noted. Pt aaox4. Connected to continuous pulse ox, cardiac and bp monitoring     Pt reports taking BP meds this am

## 2025-04-13 NOTE — ED NOTES
Reviewed discharge information with patient and family member. Aware to bring paper prescription to pharmacy of choice and follow up with primary doctor. No other voiced concerns when asked, independently ambulatory to lobby.

## 2025-04-13 NOTE — DISCHARGE INSTRUCTIONS

## 2025-04-14 LAB
OHS QRS DURATION: 80 MS
OHS QTC CALCULATION: 465 MS

## (undated) DEVICE — COVER PROBE US 5.5X58L NON LTX

## (undated) DEVICE — CATH JACKY RADIAL 5FR 100CM

## (undated) DEVICE — KIT LEFT HEART MANIFOLD CUSTOM

## (undated) DEVICE — KIT GLIDESHEATH SLEND 6FR 10CM

## (undated) DEVICE — BLLN SC EUPHORA RX 2.50MMX12MM

## (undated) DEVICE — CONTRAST VISIPAQUE 150ML

## (undated) DEVICE — VISE RADIFOCUS MULTI TORQUE

## (undated) DEVICE — CATH TREK RX 2.50MM X 20MM

## (undated) DEVICE — CATH JACKY RADIAL 3.5 110CM

## (undated) DEVICE — CATH IMPULSE 5F 100CM FR4

## (undated) DEVICE — GUIDEWIRE VERRATA + JTIP 185CM

## (undated) DEVICE — PAD DEFIB CADENCE ADULT R2

## (undated) DEVICE — CATH EAGLE EYE ST .014X20X150

## (undated) DEVICE — SYR MED RAD 150ML

## (undated) DEVICE — CATH IMPULSE 5FR PIGTAIL 125CM

## (undated) DEVICE — INFLATOR ENCORE 26 BLLN INFL

## (undated) DEVICE — Device

## (undated) DEVICE — HEMOSTAT VASC BAND REG 24CM

## (undated) DEVICE — CATH FL 3.5 5FR

## (undated) DEVICE — SEE MEDLINE ITEM 157187

## (undated) DEVICE — GUIDE LAUNCHER 6FR EBU 3.5

## (undated) DEVICE — GUIDE WIRE BMW 014 X190

## (undated) DEVICE — TUBING HPCIL ROT M/F ADPT 48IN